# Patient Record
Sex: FEMALE | Race: OTHER | URBAN - METROPOLITAN AREA
[De-identification: names, ages, dates, MRNs, and addresses within clinical notes are randomized per-mention and may not be internally consistent; named-entity substitution may affect disease eponyms.]

---

## 2017-02-07 ENCOUNTER — INPATIENT (INPATIENT)
Facility: HOSPITAL | Age: 47
LOS: 3 days | Discharge: HOME | DRG: 345 | End: 2017-02-11
Attending: SURGERY | Admitting: SURGERY
Payer: COMMERCIAL

## 2017-02-07 VITALS
RESPIRATION RATE: 16 BRPM | DIASTOLIC BLOOD PRESSURE: 72 MMHG | WEIGHT: 147.71 LBS | SYSTOLIC BLOOD PRESSURE: 143 MMHG | HEART RATE: 75 BPM | OXYGEN SATURATION: 100 % | TEMPERATURE: 98 F

## 2017-02-07 DIAGNOSIS — Z98.890 OTHER SPECIFIED POSTPROCEDURAL STATES: Chronic | ICD-10-CM

## 2017-02-07 LAB
ALBUMIN SERPL ELPH-MCNC: 3.3 G/DL — LOW (ref 3.4–5)
ALP SERPL-CCNC: 81 U/L — SIGNIFICANT CHANGE UP (ref 40–120)
ALT FLD-CCNC: 17 U/L — SIGNIFICANT CHANGE UP (ref 12–42)
ANION GAP SERPL CALC-SCNC: 9 MMOL/L — SIGNIFICANT CHANGE UP (ref 9–16)
APPEARANCE UR: CLEAR — SIGNIFICANT CHANGE UP
APTT BLD: 29.3 SEC — SIGNIFICANT CHANGE UP (ref 27.5–37.4)
AST SERPL-CCNC: 12 U/L — LOW (ref 15–37)
BASE EXCESS BLDA CALC-SCNC: -1.5 MMOL/L — SIGNIFICANT CHANGE UP (ref -2–3)
BASOPHILS NFR BLD AUTO: 1.5 % — SIGNIFICANT CHANGE UP (ref 0–2)
BILIRUB SERPL-MCNC: 0.4 MG/DL — SIGNIFICANT CHANGE UP (ref 0.2–1.2)
BILIRUB UR-MCNC: NEGATIVE — SIGNIFICANT CHANGE UP
BLD GP AB SCN SERPL QL: NEGATIVE — SIGNIFICANT CHANGE UP
BUN SERPL-MCNC: 8 MG/DL — SIGNIFICANT CHANGE UP (ref 7–23)
CA-I BLDA-SCNC: 1 MMOL/L — LOW (ref 1.1–1.3)
CALCIUM SERPL-MCNC: 8.1 MG/DL — LOW (ref 8.5–10.5)
CHLORIDE SERPL-SCNC: 108 MMOL/L — SIGNIFICANT CHANGE UP (ref 96–108)
CO2 SERPL-SCNC: 25 MMOL/L — SIGNIFICANT CHANGE UP (ref 22–31)
COHGB MFR BLDA: 0.8 % — SIGNIFICANT CHANGE UP
COLOR SPEC: YELLOW — SIGNIFICANT CHANGE UP
CREAT SERPL-MCNC: 0.42 MG/DL — LOW (ref 0.5–1.3)
DIFF PNL FLD: NEGATIVE — SIGNIFICANT CHANGE UP
EOSINOPHIL NFR BLD AUTO: 0.6 % — SIGNIFICANT CHANGE UP (ref 0–6)
GAS PNL BLDA: SIGNIFICANT CHANGE UP
GLUCOSE SERPL-MCNC: 88 MG/DL — SIGNIFICANT CHANGE UP (ref 70–99)
GLUCOSE UR QL: NEGATIVE — SIGNIFICANT CHANGE UP
HCG UR QL: NEGATIVE — SIGNIFICANT CHANGE UP
HCO3 BLDA-SCNC: 22 MMOL/L — SIGNIFICANT CHANGE UP (ref 21–28)
HCT VFR BLD CALC: 19.2 % — CRITICAL LOW (ref 34.5–45)
HCT VFR BLD CALC: 20.4 % — CRITICAL LOW (ref 34.5–45)
HCT VFR BLD CALC: 28.6 % — LOW (ref 34.5–45)
HGB BLD-MCNC: 5.1 G/DL — CRITICAL LOW (ref 11.5–15.5)
HGB BLD-MCNC: 5.6 G/DL — CRITICAL LOW (ref 11.5–15.5)
HGB BLD-MCNC: 8.8 G/DL — LOW (ref 11.5–15.5)
HGB BLDA-MCNC: 7.2 G/DL — LOW (ref 11.5–15.5)
INR BLD: 1.12 — SIGNIFICANT CHANGE UP (ref 0.88–1.16)
KETONES UR-MCNC: NEGATIVE — SIGNIFICANT CHANGE UP
LACTATE SERPL-SCNC: 1.1 MMOL/L — SIGNIFICANT CHANGE UP (ref 0.5–2)
LEUKOCYTE ESTERASE UR-ACNC: NEGATIVE — SIGNIFICANT CHANGE UP
LIDOCAIN IGE QN: 114 U/L — SIGNIFICANT CHANGE UP (ref 73–393)
LYMPHOCYTES # BLD AUTO: 21.5 % — SIGNIFICANT CHANGE UP (ref 13–44)
MCHC RBC-ENTMCNC: 14.5 PG — LOW (ref 27–34)
MCHC RBC-ENTMCNC: 14.9 PG — LOW (ref 27–34)
MCHC RBC-ENTMCNC: 18.8 PG — LOW (ref 27–34)
MCHC RBC-ENTMCNC: 26.6 G/DL — LOW (ref 32–36)
MCHC RBC-ENTMCNC: 27.5 G/DL — LOW (ref 32–36)
MCHC RBC-ENTMCNC: 30.8 G/DL — LOW (ref 32–36)
MCV RBC AUTO: 54.1 FL — LOW (ref 80–100)
MCV RBC AUTO: 54.7 FL — LOW (ref 80–100)
MCV RBC AUTO: 61.2 FL — LOW (ref 80–100)
MONOCYTES NFR BLD AUTO: 16 % — HIGH (ref 2–14)
NEUTROPHILS NFR BLD AUTO: 60.4 % — SIGNIFICANT CHANGE UP (ref 43–77)
NITRITE UR-MCNC: NEGATIVE — SIGNIFICANT CHANGE UP
O2 CT VFR BLDA CALC: SIGNIFICANT CHANGE UP (ref 15–23)
OXYHGB MFR BLDA: 99 % — SIGNIFICANT CHANGE UP (ref 94–100)
PCO2 BLDA: 30 MMHG — LOW (ref 32–45)
PH BLDA: 7.48 — HIGH (ref 7.35–7.45)
PH UR: 8.5 — HIGH (ref 4–8)
PLATELET # BLD AUTO: 187 K/UL — SIGNIFICANT CHANGE UP (ref 150–400)
PLATELET # BLD AUTO: 223 K/UL — SIGNIFICANT CHANGE UP (ref 150–400)
PLATELET # BLD AUTO: 255 K/UL — SIGNIFICANT CHANGE UP (ref 150–400)
PO2 BLDA: 474 MMHG — HIGH (ref 83–108)
POTASSIUM BLDA-SCNC: 4.2 MMOL/L — SIGNIFICANT CHANGE UP (ref 3.5–4.9)
POTASSIUM SERPL-MCNC: 3.7 MMOL/L — SIGNIFICANT CHANGE UP (ref 3.5–5.3)
POTASSIUM SERPL-SCNC: 3.7 MMOL/L — SIGNIFICANT CHANGE UP (ref 3.5–5.3)
PROT SERPL-MCNC: 6.8 G/DL — SIGNIFICANT CHANGE UP (ref 6.4–8.2)
PROT UR-MCNC: NEGATIVE MG/DL — SIGNIFICANT CHANGE UP
PROTHROM AB SERPL-ACNC: 12.5 SEC — SIGNIFICANT CHANGE UP (ref 10–13.1)
RBC # BLD: 3.51 M/UL — LOW (ref 3.8–5.2)
RBC # BLD: 3.77 M/UL — LOW (ref 3.8–5.2)
RBC # BLD: 4.67 M/UL — SIGNIFICANT CHANGE UP (ref 3.8–5.2)
RBC # FLD: 20.7 % — HIGH (ref 10.3–16.9)
RBC # FLD: 20.7 % — HIGH (ref 10.3–16.9)
RBC # FLD: 28.2 % — HIGH (ref 10.3–16.9)
RH IG SCN BLD-IMP: POSITIVE — SIGNIFICANT CHANGE UP
SAO2 % BLDA: 100 % — SIGNIFICANT CHANGE UP (ref 95–100)
SODIUM BLDA-SCNC: 134 MMOL/L — LOW (ref 138–146)
SODIUM SERPL-SCNC: 142 MMOL/L — SIGNIFICANT CHANGE UP (ref 135–145)
SP GR SPEC: 1.01 — SIGNIFICANT CHANGE UP (ref 1–1.03)
UROBILINOGEN FLD QL: 2 E.U./DL
WBC # BLD: 10.7 K/UL — HIGH (ref 3.8–10.5)
WBC # BLD: 3.4 K/UL — LOW (ref 3.8–10.5)
WBC # BLD: 3.9 K/UL — SIGNIFICANT CHANGE UP (ref 3.8–10.5)
WBC # FLD AUTO: 10.7 K/UL — HIGH (ref 3.8–10.5)
WBC # FLD AUTO: 3.4 K/UL — LOW (ref 3.8–10.5)
WBC # FLD AUTO: 3.9 K/UL — SIGNIFICANT CHANGE UP (ref 3.8–10.5)

## 2017-02-07 PROCEDURE — 74177 CT ABD & PELVIS W/CONTRAST: CPT | Mod: 26

## 2017-02-07 PROCEDURE — 44055 CORRECT MALROTATION OF BOWEL: CPT | Mod: GC

## 2017-02-07 PROCEDURE — 71010: CPT | Mod: 26

## 2017-02-07 PROCEDURE — 99285 EMERGENCY DEPT VISIT HI MDM: CPT | Mod: 25

## 2017-02-07 RX ORDER — HYDROMORPHONE HYDROCHLORIDE 2 MG/ML
1 INJECTION INTRAMUSCULAR; INTRAVENOUS; SUBCUTANEOUS EVERY 4 HOURS
Qty: 0 | Refills: 0 | Status: DISCONTINUED | OUTPATIENT
Start: 2017-02-07 | End: 2017-02-10

## 2017-02-07 RX ORDER — SODIUM CHLORIDE 9 MG/ML
2000 INJECTION INTRAMUSCULAR; INTRAVENOUS; SUBCUTANEOUS ONCE
Qty: 0 | Refills: 0 | Status: COMPLETED | OUTPATIENT
Start: 2017-02-07 | End: 2017-02-07

## 2017-02-07 RX ORDER — SODIUM CHLORIDE 9 MG/ML
1000 INJECTION, SOLUTION INTRAVENOUS
Qty: 0 | Refills: 0 | Status: DISCONTINUED | OUTPATIENT
Start: 2017-02-07 | End: 2017-02-10

## 2017-02-07 RX ORDER — MORPHINE SULFATE 50 MG/1
4 CAPSULE, EXTENDED RELEASE ORAL ONCE
Qty: 0 | Refills: 0 | Status: DISCONTINUED | OUTPATIENT
Start: 2017-02-07 | End: 2017-02-07

## 2017-02-07 RX ORDER — HEPARIN SODIUM 5000 [USP'U]/ML
5000 INJECTION INTRAVENOUS; SUBCUTANEOUS EVERY 8 HOURS
Qty: 0 | Refills: 0 | Status: DISCONTINUED | OUTPATIENT
Start: 2017-02-07 | End: 2017-02-11

## 2017-02-07 RX ORDER — IOHEXOL 300 MG/ML
50 INJECTION, SOLUTION INTRAVENOUS ONCE
Qty: 0 | Refills: 0 | Status: COMPLETED | OUTPATIENT
Start: 2017-02-07 | End: 2017-02-07

## 2017-02-07 RX ORDER — HYDROMORPHONE HYDROCHLORIDE 2 MG/ML
0.5 INJECTION INTRAMUSCULAR; INTRAVENOUS; SUBCUTANEOUS EVERY 4 HOURS
Qty: 0 | Refills: 0 | Status: DISCONTINUED | OUTPATIENT
Start: 2017-02-07 | End: 2017-02-10

## 2017-02-07 RX ORDER — ONDANSETRON 8 MG/1
4 TABLET, FILM COATED ORAL EVERY 6 HOURS
Qty: 0 | Refills: 0 | Status: DISCONTINUED | OUTPATIENT
Start: 2017-02-07 | End: 2017-02-11

## 2017-02-07 RX ADMIN — MORPHINE SULFATE 4 MILLIGRAM(S): 50 CAPSULE, EXTENDED RELEASE ORAL at 11:28

## 2017-02-07 RX ADMIN — MORPHINE SULFATE 4 MILLIGRAM(S): 50 CAPSULE, EXTENDED RELEASE ORAL at 15:50

## 2017-02-07 RX ADMIN — SODIUM CHLORIDE 2000 MILLILITER(S): 9 INJECTION INTRAMUSCULAR; INTRAVENOUS; SUBCUTANEOUS at 11:29

## 2017-02-07 RX ADMIN — MORPHINE SULFATE 4 MILLIGRAM(S): 50 CAPSULE, EXTENDED RELEASE ORAL at 12:14

## 2017-02-07 RX ADMIN — MORPHINE SULFATE 4 MILLIGRAM(S): 50 CAPSULE, EXTENDED RELEASE ORAL at 11:29

## 2017-02-07 RX ADMIN — IOHEXOL 50 MILLILITER(S): 300 INJECTION, SOLUTION INTRAVENOUS at 11:28

## 2017-02-07 NOTE — ED ADULT NURSE REASSESSMENT NOTE - NS ED NURSE REASSESS COMMENT FT1
OR called for report, report given to KEI Evangelista but pt not admitted/signed out. Surgery MD Srivastava in department at this time, spoke with SHANAE Hobbs and accepted pt to surgical service, pt to be transported to OR stat. RN Mecua transporting pt to OR.

## 2017-02-07 NOTE — H&P ADULT. - RS GEN PE MLT RESP DETAILS PC
breath sounds equal/clear to auscultation bilaterally/no rhonchi/no wheezes/no rales/airway patent/respirations non-labored

## 2017-02-07 NOTE — H&P ADULT. - RADIOLOGY RESULTS AND INTERPRETATION
EXAM:  CT ABDOMEN AND PELVIS OC IC    ////      PROCEDURE DATE:  02/07/2017    IMPRESSION:  1.  Swirling of mesenteric vessels. Mild dilatation of the mid small   bowel and normal caliber distal small bowel but containing oral contrast   which may represent a localized small bowel ileus. However, a repeat CT   in 2 to 3 hours may be helpful to exclude partial or early bowel   obstruction given the swirling of the mesenteric vessels.    2.  2.3 x 2.0 cm heterogeneous solid mass in the upper pole of the left   kidney concerning for renal cell carcinoma. Recommend further evaluation   with MRI and renal mass protocol.  3.  Small fat-containing umbilical hernia with minimal fat stranding   within it. Recommend correlation with physical exam to determine if it is   reducible.  4.  Cholelithiasis within a slightly distended gallbladder.  5.  Small pelvic free fluid, more than normally seen physiologically.

## 2017-02-07 NOTE — ED PROVIDER NOTE - GASTROINTESTINAL, MLM
Abdomen with periumbilical tenderness, no rebound.  No RLQ or RUQ tenderness.  Questionable umbilical hernia on initial exam, suspect reduced with palpation.  Rectal nontender, Guaiac negative.

## 2017-02-07 NOTE — H&P ADULT. - HISTORY OF PRESENT ILLNESS
45yo F with PMHx of chronic anemia, morbid obesity s/p RYGB in 2004 and total body lift in 2006 who presents with a 1-day history of acute onset abdominal pain.  Concern for internal hernia. 47yo F with PMHx of chronic anemia, morbid obesity s/p RYGB in 2004 and total body lift in 2006 who presents with a 1-day history of acute onset abdominal pain.  Patient states that it started around 9pm last night, is crampy yet constant, a 10/10 in severity at its worst.  Patient referred to feeling like she was having "menstrual cramps that wouldn't go away" diffusely throughout her abdomen.  Patient denies n/v/f/c.  Last BM was 8 this morning, normal per pt.  Pt last recalls passing flatus yesterday.  Endorses increased belching.  CT scan shows e/o possible internal hernia/hall's hernia and concerning L renal mass.  Clinical exam reveals moderate abdominal discomfort.  Of note, patient's hgb on arrival to the ED is 5.6.  Received 2 units pRBCs in ED. Patient states she has had chronic anemia even before undergoing bypass.  Although, states that she was never worked-up for it.   Patient had bypass in 2004 and was told at that time that she would likely require nutritional support in the future, but patient has never followed up with her surgeon (she thinks it was a Dr. Crawley? at [facility]) or even a PCP.

## 2017-02-07 NOTE — ED ADULT TRIAGE NOTE - ARRIVAL INFO ADDITIONAL COMMENTS
Generalized abdominal pain and lower back pain. No prior tx. Denies any fever, N/V/D, urinary symptoms, blood in urine or stool, syncopal episodes.

## 2017-02-07 NOTE — ED ADULT NURSE REASSESSMENT NOTE - NS ED NURSE REASSESS COMMENT FT1
Per SHANAE Baum to run first pRBC unit over 1 hour and 2nd unit over 2 hours. 1st unit almost complete, second unit to be obtained from blood bank. Pt remains with VSS and no c/o fevers, chills, SOB, back pain. Will continue to monitor.

## 2017-02-07 NOTE — ED ADULT NURSE REASSESSMENT NOTE - NS ED NURSE REASSESS COMMENT FT1
Received pt from KEI Levine transferred to [facility] for closer monitoring and possible pRBC transfusion. Pt endorses 9/10 generalized abdominal pain at this time, SHANAE Baum aware. Pt returned from CT and XR, repeat CBC drawn and sent. Per SHANAE Baum pt pending CT results and repeat CBC results to determine if pt will be transfused. Pt aware with plan of care with family at bedside, tv provided. Will continue to monitor.

## 2017-02-07 NOTE — H&P ADULT. - ASSESSMENT
47yo F with PMHx of chronic anemia, morbid obesity s/p RYGB in 2004 and total body lift in 2006 who presents with a 1-day history of acute onset abdominal pain.  Concern for internal hernia.    - Admit patient to team 2 surgery (Dr. Ocampo)  - Book OR for diagnostic laparoscopy, class II  - PPx: 47yo F with PMHx of chronic anemia, morbid obesity s/p RYGB in 2004 and total body lift in 2006 who presents with a 1-day history of acute onset abdominal pain.  No n/v/f/c, +BM, but CT very concerning for internal hernia and possible renal malignancy.  Exam with tympany LUQ and moderate discomfort on palpation.  Patient severely anemic, received 2 units pRBCs in ED.      - Admit patient to team 2 surgery (Dr. Ocampo)  - Book OR for diagnostic laparoscopy, class II  - NPO, IVF  - No home meds  - Urology consult for possible renal malignancy  - Patient needs to be set up with PCP for issues of anemia/nutritional support  - PPx: SCDs, OOB/ambulate as tolerated

## 2017-02-07 NOTE — ED PROVIDER NOTE - OBJECTIVE STATEMENT
pt with hx of Gastric Bypass in 2004, several cosmetic surgeries, chronic anemia (? type) - presenting with abdominal pain since last night.  The pain began around 10 pm while at rest watching TV.  Pain is located periumbilically, radiates across her back, and is constant.  No nausea or vomiting, no fever, no diarrhea or constipation. Last BM today was "normal".  No melena or BRBPR.  No urinary symptoms.

## 2017-02-07 NOTE — PROGRESS NOTE ADULT - SUBJECTIVE AND OBJECTIVE BOX
POST-OPERATIVE NOTE    Procedure: Diagnostic laparoscopy converted to exploratory laparotomy with reduction of internal hernia and primary closure of mesenteric defect.    Diagnosis/Indication:internal hernia    Surgeon: Dr. Ocampo    S: Pt seen and examined at bedside in the PACU, currently has no complaints.  She denies CP/SOB/N/V and has yet to void.    O:  T(C): 36.4, Max: 36.4 (02-07 @ 22:30)  T(F): 97.5, Max: 97.5 (02-07 @ 22:30)  HR: 78 (71 - 78)  BP: 128/64 (121/59 - 151/52)  RR: 18 (15 - 18)  SpO2: 99% (98% - 100%)  Wt(kg): --                        8.8    10.7  )-----------( 187      ( 07 Feb 2017 22:31 )             28.6     07 Feb 2017 11:32    142    |  108    |  8      ----------------------------<  88     3.7     |  25     |  0.42     Ca    8.1        07 Feb 2017 11:32    TPro  6.8    /  Alb  3.3    /  TBili  0.4    /  DBili  x      /  AST  12     /  ALT  17     /  AlkPhos  81     07 Feb 2017 11:32      Gen: NAD, resting comfortably in bed  C/V: NSR  Pulm: Nonlabored breathing, no respiratory distress, CTAB, IS 1000  Abd: soft, NT/ND, midline incision dressing c/d/i  Extrem: WWP, no calf edema or tenderness, SCDs in place      A/P: 46y Female s/p above procedure with anemia of chronic dz now s/p 3u pRBC transfused with post op hgb at 8.8.  NPO/IVF o/n  Pain/nausea control  SCDs/SQH  TOV  OOBA/IS  AM labs

## 2017-02-07 NOTE — BRIEF OPERATIVE NOTE - OPERATION/FINDINGS
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  Congested small bowel mesentery

## 2017-02-07 NOTE — ED ADULT NURSE NOTE - OBJECTIVE STATEMENT
Patient to ED complaining of generalized abdominal pain x 3 days, denies associative symptoms of nausea/vomiting/diarrhea. Patient reports constant aching pain. Stool guaiac negative. Patient denies SOB, fever, chest pain, heart burn, dizziness. Bowel sounds auscultated in all 4 quadrants, soft abdomen. Mild distention noted.

## 2017-02-07 NOTE — ED PROVIDER NOTE - MEDICAL DECISION MAKING DETAILS
Pt with hx of gastric bypass having severe periumbilical pain.  Severely anemic, confirmed on repeat CBC Hb 5.1, will transfuse.  Surgery consulted and CT results pending.

## 2017-02-07 NOTE — ED PROVIDER NOTE - ATTENDING CONTRIBUTION TO CARE
46 F hx of gastric bypass 13 year ago p/w periumbilical pain since last night, no n/v/d/c.  Pt with  stable VS, ttp periumbilical region, uncomfortable.  ? hernia to umbilicus.  Plan labs, CT, pain control and reassess.

## 2017-02-08 LAB
ANION GAP SERPL CALC-SCNC: 8 MMOL/L — LOW (ref 9–16)
BUN SERPL-MCNC: 6 MG/DL — LOW (ref 7–23)
CALCIUM SERPL-MCNC: 7.9 MG/DL — LOW (ref 8.5–10.5)
CHLORIDE SERPL-SCNC: 107 MMOL/L — SIGNIFICANT CHANGE UP (ref 96–108)
CO2 SERPL-SCNC: 26 MMOL/L — SIGNIFICANT CHANGE UP (ref 22–31)
CREAT SERPL-MCNC: 0.47 MG/DL — LOW (ref 0.5–1.3)
GLUCOSE SERPL-MCNC: 136 MG/DL — HIGH (ref 70–99)
HCT VFR BLD CALC: 28.4 % — LOW (ref 34.5–45)
HGB BLD-MCNC: 8.7 G/DL — LOW (ref 11.5–15.5)
MAGNESIUM SERPL-MCNC: 1.7 MG/DL — SIGNIFICANT CHANGE UP (ref 1.6–2.4)
MCHC RBC-ENTMCNC: 18.9 PG — LOW (ref 27–34)
MCHC RBC-ENTMCNC: 30.6 G/DL — LOW (ref 32–36)
MCV RBC AUTO: 61.6 FL — LOW (ref 80–100)
METHGB MFR BLDA: 0 % — SIGNIFICANT CHANGE UP
PHOSPHATE SERPL-MCNC: 4.1 MG/DL — SIGNIFICANT CHANGE UP (ref 2.5–4.5)
PLATELET # BLD AUTO: 152 K/UL — SIGNIFICANT CHANGE UP (ref 150–400)
POTASSIUM SERPL-MCNC: 3.8 MMOL/L — SIGNIFICANT CHANGE UP (ref 3.5–5.3)
POTASSIUM SERPL-SCNC: 3.8 MMOL/L — SIGNIFICANT CHANGE UP (ref 3.5–5.3)
RBC # BLD: 4.61 M/UL — SIGNIFICANT CHANGE UP (ref 3.8–5.2)
RBC # FLD: 27.5 % — HIGH (ref 10.3–16.9)
SODIUM SERPL-SCNC: 141 MMOL/L — SIGNIFICANT CHANGE UP (ref 135–145)
WBC # BLD: 11.9 K/UL — HIGH (ref 3.8–10.5)
WBC # FLD AUTO: 11.9 K/UL — HIGH (ref 3.8–10.5)

## 2017-02-08 PROCEDURE — 99223 1ST HOSP IP/OBS HIGH 75: CPT

## 2017-02-08 PROCEDURE — 74183 MRI ABD W/O CNTR FLWD CNTR: CPT | Mod: 26

## 2017-02-08 RX ORDER — PREGABALIN 225 MG/1
1000 CAPSULE ORAL ONCE
Qty: 0 | Refills: 0 | Status: COMPLETED | OUTPATIENT
Start: 2017-02-08 | End: 2017-02-08

## 2017-02-08 RX ORDER — FOLIC ACID/VIT B COMPLEX AND C 400 MCG
10000 TABLET ORAL ONCE
Qty: 0 | Refills: 0 | Status: COMPLETED | OUTPATIENT
Start: 2017-02-08 | End: 2017-02-08

## 2017-02-08 RX ORDER — MAGNESIUM SULFATE 500 MG/ML
2 VIAL (ML) INJECTION ONCE
Qty: 0 | Refills: 0 | Status: COMPLETED | OUTPATIENT
Start: 2017-02-08 | End: 2017-02-08

## 2017-02-08 RX ORDER — ERGOCALCIFEROL 1.25 MG/1
50000 CAPSULE ORAL
Qty: 0 | Refills: 0 | Status: DISCONTINUED | OUTPATIENT
Start: 2017-02-08 | End: 2017-02-11

## 2017-02-08 RX ORDER — SODIUM FERRIC GLUCONAT/SUCROSE 62.5MG/5ML
25 AMPUL (ML) INTRAVENOUS ONCE
Qty: 0 | Refills: 0 | Status: COMPLETED | OUTPATIENT
Start: 2017-02-08 | End: 2017-02-08

## 2017-02-08 RX ORDER — SODIUM FERRIC GLUCONAT/SUCROSE 62.5MG/5ML
100 AMPUL (ML) INTRAVENOUS ONCE
Qty: 0 | Refills: 0 | Status: COMPLETED | OUTPATIENT
Start: 2017-02-08 | End: 2017-02-08

## 2017-02-08 RX ADMIN — Medication 10000 UNIT(S): at 15:36

## 2017-02-08 RX ADMIN — HYDROMORPHONE HYDROCHLORIDE 1 MILLIGRAM(S): 2 INJECTION INTRAMUSCULAR; INTRAVENOUS; SUBCUTANEOUS at 01:30

## 2017-02-08 RX ADMIN — PREGABALIN 1000 MICROGRAM(S): 225 CAPSULE ORAL at 15:37

## 2017-02-08 RX ADMIN — SODIUM CHLORIDE 125 MILLILITER(S): 9 INJECTION, SOLUTION INTRAVENOUS at 09:35

## 2017-02-08 RX ADMIN — HYDROMORPHONE HYDROCHLORIDE 1 MILLIGRAM(S): 2 INJECTION INTRAMUSCULAR; INTRAVENOUS; SUBCUTANEOUS at 11:55

## 2017-02-08 RX ADMIN — HYDROMORPHONE HYDROCHLORIDE 1 MILLIGRAM(S): 2 INJECTION INTRAMUSCULAR; INTRAVENOUS; SUBCUTANEOUS at 06:19

## 2017-02-08 RX ADMIN — ERGOCALCIFEROL 50000 UNIT(S): 1.25 CAPSULE ORAL at 15:39

## 2017-02-08 RX ADMIN — Medication 108 MILLIGRAM(S): at 19:25

## 2017-02-08 RX ADMIN — HYDROMORPHONE HYDROCHLORIDE 1 MILLIGRAM(S): 2 INJECTION INTRAMUSCULAR; INTRAVENOUS; SUBCUTANEOUS at 17:51

## 2017-02-08 RX ADMIN — HYDROMORPHONE HYDROCHLORIDE 1 MILLIGRAM(S): 2 INJECTION INTRAMUSCULAR; INTRAVENOUS; SUBCUTANEOUS at 18:15

## 2017-02-08 RX ADMIN — HYDROMORPHONE HYDROCHLORIDE 1 MILLIGRAM(S): 2 INJECTION INTRAMUSCULAR; INTRAVENOUS; SUBCUTANEOUS at 06:15

## 2017-02-08 RX ADMIN — Medication 50 GRAM(S): at 09:38

## 2017-02-08 RX ADMIN — HEPARIN SODIUM 5000 UNIT(S): 5000 INJECTION INTRAVENOUS; SUBCUTANEOUS at 05:41

## 2017-02-08 RX ADMIN — Medication 204 MILLIGRAM(S): at 15:37

## 2017-02-08 RX ADMIN — HYDROMORPHONE HYDROCHLORIDE 1 MILLIGRAM(S): 2 INJECTION INTRAMUSCULAR; INTRAVENOUS; SUBCUTANEOUS at 11:09

## 2017-02-08 RX ADMIN — HEPARIN SODIUM 5000 UNIT(S): 5000 INJECTION INTRAVENOUS; SUBCUTANEOUS at 21:32

## 2017-02-08 RX ADMIN — HYDROMORPHONE HYDROCHLORIDE 1 MILLIGRAM(S): 2 INJECTION INTRAMUSCULAR; INTRAVENOUS; SUBCUTANEOUS at 01:36

## 2017-02-08 RX ADMIN — HEPARIN SODIUM 5000 UNIT(S): 5000 INJECTION INTRAVENOUS; SUBCUTANEOUS at 14:33

## 2017-02-08 NOTE — CONSULT NOTE ADULT - SUBJECTIVE AND OBJECTIVE BOX
HPI:  46F hx chronic anemia, morbid obesity s/p RYGB in 2004 and total body lift in 2006 who presents with a 1-day history of acute onset abdominal pain.  Patient found to have internal hiatal hernia s/p hernia repair 2/8. Called to see patient for incidental finding of 2.3cm upper pole left kidney mass. Pt denies history hematuria, dysuria, incontinence, retention or other LUTS.  No hx kidney stones or UTIs. Denies history of smoking. No flank pain.       Vital Signs Last 24 Hrs  T(C): 37.1, Max: 37.1 (02-08 @ 06:19)  T(F): 98.7, Max: 98.8 (02-08 @ 06:19)  HR: 78 (67 - 91)  BP: 122/67 (106/61 - 151/52)  BP(mean): --  RR: 16 (15 - 18)  SpO2: 96% (95% - 100%)  I&O's Summary  I & Os for 24h ending 08 Feb 2017 07:00  =============================================  IN: 0 ml / OUT: 800 ml / NET: -800 ml    I & Os for current day (as of 08 Feb 2017 12:21)  =============================================  IN: 250 ml / OUT: 450 ml / NET: -200 ml      PE:  Gen: NAD  Abd: slightly distended, calvin TTP postop, prior incision well healed  : voiding clear     LABS:                        8.7    11.9  )-----------( 152      ( 08 Feb 2017 06:08 )             28.4     08 Feb 2017 06:08    141    |  107    |  6      ----------------------------<  136    3.8     |  26     |  0.47     Ca    7.9        08 Feb 2017 06:08  Phos  4.1       08 Feb 2017 06:08  Mg     1.7       08 Feb 2017 06:08    TPro  6.8    /  Alb  3.3    /  TBili  0.4    /  DBili  x      /  AST  12     /  ALT  17     /  AlkPhos  81     07 Feb 2017 11:32    PT/INR - ( 07 Feb 2017 12:15 )   PT: 12.5 sec;   INR: 1.12          PTT - ( 07 Feb 2017 12:15 )  PTT:29.3 sec  Cultures      A/P: 46F postop hiatal hernia repair with incidental 2 x2.3cm upper pole mass in L kidney  1- Will discuss need for MRI with Dr Caban  2- Fu with Dr Caban as outpatient for serial imaging HPI:  46F hx chronic anemia, morbid obesity s/p RYGB in 2004 and total body lift in 2006 who presents with a 1-day history of acute onset abdominal pain.  Patient found to have internal hiatal hernia s/p hernia repair 2/8. Called to see patient for incidental finding of 2.3cm upper pole left kidney mass. Pt denies history hematuria, dysuria, incontinence, retention or other LUTS.  No hx kidney stones or UTIs. Denies history of smoking. No flank pain.       Vital Signs Last 24 Hrs  T(C): 37.1, Max: 37.1 (02-08 @ 06:19)  T(F): 98.7, Max: 98.8 (02-08 @ 06:19)  HR: 78 (67 - 91)  BP: 122/67 (106/61 - 151/52)  BP(mean): --  RR: 16 (15 - 18)  SpO2: 96% (95% - 100%)  I&O's Summary  I & Os for 24h ending 08 Feb 2017 07:00  =============================================  IN: 0 ml / OUT: 800 ml / NET: -800 ml    I & Os for current day (as of 08 Feb 2017 12:21)  =============================================  IN: 250 ml / OUT: 450 ml / NET: -200 ml      PE:  Gen: NAD  Abd: slightly distended, calvin TTP postop, prior incision well healed  : voiding clear     LABS:                        8.7    11.9  )-----------( 152      ( 08 Feb 2017 06:08 )             28.4     08 Feb 2017 06:08    141    |  107    |  6      ----------------------------<  136    3.8     |  26     |  0.47     Ca    7.9        08 Feb 2017 06:08  Phos  4.1       08 Feb 2017 06:08  Mg     1.7       08 Feb 2017 06:08    TPro  6.8    /  Alb  3.3    /  TBili  0.4    /  DBili  x      /  AST  12     /  ALT  17     /  AlkPhos  81     07 Feb 2017 11:32    PT/INR - ( 07 Feb 2017 12:15 )   PT: 12.5 sec;   INR: 1.12          PTT - ( 07 Feb 2017 12:15 )  PTT:29.3 sec  Cultures      A/P: 46F postop hiatal hernia repair with incidental 2 x2.3cm upper pole mass in L kidney  1- MRI renal mass protocol  2- Fu with Dr Caban as outpatient for serial imaging

## 2017-02-08 NOTE — PROGRESS NOTE ADULT - SUBJECTIVE AND OBJECTIVE BOX
O/N: OR for open reyes's hernia repair, 1 additional pRBC unit given in OR, POC WNL, IS 1000,  passed TOV.  2/7: OR, hgb 5.6 on arrival, received 2 units pRBCs in ED    STATUS POST: diagnostic lap converted to ex lap with reduction of reyes's hernia and primary repair of mesenteric defect. NO bowel resected.    POST OP DAY #: 1 O/N: OR for open reyes's hernia repair, 1 additional pRBC unit given in OR, POC WNL, IS 1000,  passed TOV.  : OR, hgb 5.6 on arrival, received 2 units pRBCs in ED    STATUS POST: diagnostic lap converted to ex lap with reduction of reyes's hernia and primary repair of mesenteric defect. NO bowel resected.    POST OP DAY #: 1        SUBJECTIVE: Patient complains of incisional pain  Flatus: [] YES [X] NO             Bowel Movement: [ ] YES [X ] NO  Pain (0-10):            Pain Control Adequate: [X ] YES [ ] NO  Nausea: [ ] YES [X ] NO            Vomiting: [ ] YES [X ] NO  Diarrhea: [ ] YES [X ] NO         Constipation: [ ] YES [X ] NO     Chest Pain: [ ] YES [ X] NO    SOB:  [ ] YES [X ] NO    heparin  Injectable 5000Unit(s) SubCutaneous every 8 hours      Vital Signs Last 24 Hrs  T(C): 37.1, Max: 37.1 ( @ 06:19)  T(F): 98.7, Max: 98.8 ( @ 06:19)  HR: 84 (64 - 91)  BP: 106/61 (106/61 - 151/52)  BP(mean): --  RR: 16 (15 - 18)  SpO2: 95% (95% - 100%)  I&O's Detail    I & Os for current day (as of 2017 09:49)  =============================================  IN:    Total IN: 0 ml  ---------------------------------------------  OUT:    Voided: 800 ml    Total OUT: 800 ml  ---------------------------------------------  Total NET: -800 ml      General: NAD, resting comfortably in bed  C/V: NSR  Pulm: Nonlabored breathing, no respiratory distress  Abd: soft, non distended, TTP around incision site ,incisional clean dry and intact  Extrem: WWP, no edema, SCDs in place        LABS:                        8.7    11.9  )-----------( 152      ( 2017 06:08 )             28.4     2017 06:08    141    |  107    |  6      ----------------------------<  136    3.8     |  26     |  0.47     Ca    7.9        2017 06:08  Phos  4.1       2017 06:08  Mg     1.7       2017 06:08    TPro  6.8    /  Alb  3.3    /  TBili  0.4    /  DBili  x      /  AST  12     /  ALT  17     /  AlkPhos  81     2017 11:32    PT/INR - ( 2017 12:15 )   PT: 12.5 sec;   INR: 1.12          PTT - ( 2017 12:15 )  PTT:29.3 sec  Urinalysis Basic - ( 2017 11:32 )    Color: Yellow / Appearance: Clear / S.015 / pH: x  Gluc: x / Ketone: NEGATIVE  / Bili: NEGATIVE / Urobili: 2.0 E.U./dL   Blood: x / Protein: NEGATIVE mg/dL / Nitrite: NEGATIVE   Leuk Esterase: NEGATIVE / RBC: x / WBC x   Sq Epi: x / Non Sq Epi: x / Bacteria: x        RADIOLOGY & ADDITIONAL STUDIES:

## 2017-02-08 NOTE — CONSULT NOTE ADULT - ATTENDING COMMENTS
Patient seen and examined. Reviewed CT findings with patient and spouse. Recommend MRI renal mass protocol for better imaging. No acute intervention at this time. Can f/u as outpatient for further counseling and management.

## 2017-02-08 NOTE — PROGRESS NOTE ADULT - SUBJECTIVE AND OBJECTIVE BOX
s/p ex lap for midgut volvulus   patient is fortunate that all bowel was viable  has profound anemia which is reflective of poor use of supplements following rygb which I discussed  while in hospital would give banana bag  b12 fe and make sure started on multivitamins  importance of compliance and follow up explained in great detail  patient had original open rygb at [facility] in 2004

## 2017-02-09 LAB
24R-OH-CALCIDIOL SERPL-MCNC: 12.1 NG/ML — LOW (ref 30–100)
ANION GAP SERPL CALC-SCNC: 7 MMOL/L — LOW (ref 9–16)
BUN SERPL-MCNC: 8 MG/DL — SIGNIFICANT CHANGE UP (ref 7–23)
CALCIUM SERPL-MCNC: 8.1 MG/DL — LOW (ref 8.5–10.5)
CALCIUM SERPL-MCNC: 8.5 MG/DL — SIGNIFICANT CHANGE UP (ref 8.4–10.5)
CHLORIDE SERPL-SCNC: 107 MMOL/L — SIGNIFICANT CHANGE UP (ref 96–108)
CO2 SERPL-SCNC: 27 MMOL/L — SIGNIFICANT CHANGE UP (ref 22–31)
CREAT SERPL-MCNC: 0.52 MG/DL — SIGNIFICANT CHANGE UP (ref 0.5–1.3)
GLUCOSE SERPL-MCNC: 73 MG/DL — SIGNIFICANT CHANGE UP (ref 70–99)
HCT VFR BLD CALC: 25.5 % — LOW (ref 34.5–45)
HGB BLD-MCNC: 7.4 G/DL — LOW (ref 11.5–15.5)
MAGNESIUM SERPL-MCNC: 2.2 MG/DL — SIGNIFICANT CHANGE UP (ref 1.6–2.4)
MCHC RBC-ENTMCNC: 18.3 PG — LOW (ref 27–34)
MCHC RBC-ENTMCNC: 29 G/DL — LOW (ref 32–36)
MCV RBC AUTO: 63 FL — LOW (ref 80–100)
PHOSPHATE SERPL-MCNC: 3.2 MG/DL — SIGNIFICANT CHANGE UP (ref 2.5–4.5)
PLATELET # BLD AUTO: 129 K/UL — LOW (ref 150–400)
POTASSIUM SERPL-MCNC: 3.5 MMOL/L — SIGNIFICANT CHANGE UP (ref 3.5–5.3)
POTASSIUM SERPL-SCNC: 3.5 MMOL/L — SIGNIFICANT CHANGE UP (ref 3.5–5.3)
PTH-INTACT FLD-MCNC: 46 PG/ML — SIGNIFICANT CHANGE UP (ref 15–65)
RBC # BLD: 4.05 M/UL — SIGNIFICANT CHANGE UP (ref 3.8–5.2)
RBC # FLD: 28.4 % — HIGH (ref 10.3–16.9)
SODIUM SERPL-SCNC: 141 MMOL/L — SIGNIFICANT CHANGE UP (ref 135–145)
VIT D25+D1,25 OH+D1,25 PNL SERPL-MCNC: 61.9 PG/ML — SIGNIFICANT CHANGE UP (ref 19.9–79.3)
WBC # BLD: 7.2 K/UL — SIGNIFICANT CHANGE UP (ref 3.8–10.5)
WBC # FLD AUTO: 7.2 K/UL — SIGNIFICANT CHANGE UP (ref 3.8–10.5)

## 2017-02-09 RX ORDER — POTASSIUM CHLORIDE 20 MEQ
10 PACKET (EA) ORAL
Qty: 0 | Refills: 0 | Status: COMPLETED | OUTPATIENT
Start: 2017-02-09 | End: 2017-02-09

## 2017-02-09 RX ADMIN — HYDROMORPHONE HYDROCHLORIDE 1 MILLIGRAM(S): 2 INJECTION INTRAMUSCULAR; INTRAVENOUS; SUBCUTANEOUS at 15:04

## 2017-02-09 RX ADMIN — Medication 100 MILLIEQUIVALENT(S): at 10:37

## 2017-02-09 RX ADMIN — HYDROMORPHONE HYDROCHLORIDE 1 MILLIGRAM(S): 2 INJECTION INTRAMUSCULAR; INTRAVENOUS; SUBCUTANEOUS at 05:45

## 2017-02-09 RX ADMIN — HYDROMORPHONE HYDROCHLORIDE 1 MILLIGRAM(S): 2 INJECTION INTRAMUSCULAR; INTRAVENOUS; SUBCUTANEOUS at 21:04

## 2017-02-09 RX ADMIN — HYDROMORPHONE HYDROCHLORIDE 1 MILLIGRAM(S): 2 INJECTION INTRAMUSCULAR; INTRAVENOUS; SUBCUTANEOUS at 21:31

## 2017-02-09 RX ADMIN — HYDROMORPHONE HYDROCHLORIDE 1 MILLIGRAM(S): 2 INJECTION INTRAMUSCULAR; INTRAVENOUS; SUBCUTANEOUS at 09:50

## 2017-02-09 RX ADMIN — HYDROMORPHONE HYDROCHLORIDE 1 MILLIGRAM(S): 2 INJECTION INTRAMUSCULAR; INTRAVENOUS; SUBCUTANEOUS at 15:30

## 2017-02-09 RX ADMIN — HYDROMORPHONE HYDROCHLORIDE 1 MILLIGRAM(S): 2 INJECTION INTRAMUSCULAR; INTRAVENOUS; SUBCUTANEOUS at 09:26

## 2017-02-09 RX ADMIN — HEPARIN SODIUM 5000 UNIT(S): 5000 INJECTION INTRAVENOUS; SUBCUTANEOUS at 13:56

## 2017-02-09 RX ADMIN — Medication 100 MILLIEQUIVALENT(S): at 09:16

## 2017-02-09 RX ADMIN — HEPARIN SODIUM 5000 UNIT(S): 5000 INJECTION INTRAVENOUS; SUBCUTANEOUS at 05:14

## 2017-02-09 RX ADMIN — HEPARIN SODIUM 5000 UNIT(S): 5000 INJECTION INTRAVENOUS; SUBCUTANEOUS at 21:04

## 2017-02-09 RX ADMIN — HYDROMORPHONE HYDROCHLORIDE 1 MILLIGRAM(S): 2 INJECTION INTRAMUSCULAR; INTRAVENOUS; SUBCUTANEOUS at 00:55

## 2017-02-09 RX ADMIN — HYDROMORPHONE HYDROCHLORIDE 1 MILLIGRAM(S): 2 INJECTION INTRAMUSCULAR; INTRAVENOUS; SUBCUTANEOUS at 05:10

## 2017-02-09 RX ADMIN — SODIUM CHLORIDE 125 MILLILITER(S): 9 INJECTION, SOLUTION INTRAVENOUS at 13:56

## 2017-02-09 RX ADMIN — HYDROMORPHONE HYDROCHLORIDE 1 MILLIGRAM(S): 2 INJECTION INTRAMUSCULAR; INTRAVENOUS; SUBCUTANEOUS at 00:23

## 2017-02-09 NOTE — DIETITIAN INITIAL EVALUATION ADULT. - OTHER INFO
Pt s/p reduction of hernia. Pt tolerating Clear Liquid Diet. Denies any n/v, +flatus, -BM. Pt reports some chewing difficulty 2/2 dentures. Can tolerate soft food.  Pt with hx of RYGB in 2004. Still complies with diet (smaller portions, avoids concentrated sweets). Yet, does not take vitamin supplements. Encouraged intake of vitamins. Wt stable. Pain controlled.

## 2017-02-09 NOTE — PROGRESS NOTE ADULT - SUBJECTIVE AND OBJECTIVE BOX
O/N: MARICRUZ  2/8: patient given Iron, vitamin B12 , A, D  and zinc per Dr. Ocampo O/N: MARICRUZ  : patient given Iron, vitamin B12 , A, D  and zinc per Dr. Ocampo      SUBJECTIVE: Pt seen and examined at bedside. No overnight events.  Pain controlled. No f/c/n/v. + flatus    Vital Signs Last 24 Hrs  T(C): 36.9, Max: 37.2 (- @ 13:35)  T(F): 98.4, Max: 99 (-08 @ 13:35)  HR: 63 (63 - 84)  BP: 116/71 (105/69 - 122/72)  BP(mean): --  RR: 18 (16 - 18)  SpO2: 99% (95% - 100%)    PHYSICAL EXAM    Gen: NAD  Abd: Soft, NT/ND, midline island dressing CDI, port sites CDI    I&O's Detail    I & Os for current day (as of 2017 07:38)  =============================================  IN:    multivitamin/thiamine/folic acid in sodium chloride 0.9%: 250 ml    Total IN: 250 ml  ---------------------------------------------  OUT:    Voided: 450 ml    Total OUT: 450 ml  ---------------------------------------------  Total NET: -200 ml      LABS:                        8.7    11.9  )-----------( 152      ( 2017 06:08 )             28.4     2017 06:44    141    |  107    |  8      ----------------------------<  73     3.5     |  27     |  0.52     Ca    8.1        2017 06:44  Phos  3.2       2017 06:44  Mg     2.2       2017 06:44    TPro  6.8    /  Alb  3.3    /  TBili  0.4    /  DBili  x      /  AST  12     /  ALT  17     /  AlkPhos  81     2017 11:32    PT/INR - ( 2017 12:15 )   PT: 12.5 sec;   INR: 1.12          PTT - ( 2017 12:15 )  PTT:29.3 sec  Urinalysis Basic - ( 2017 11:32 )    Color: Yellow / Appearance: Clear / S.015 / pH: x  Gluc: x / Ketone: NEGATIVE  / Bili: NEGATIVE / Urobili: 2.0 E.U./dL   Blood: x / Protein: NEGATIVE mg/dL / Nitrite: NEGATIVE   Leuk Esterase: NEGATIVE / RBC: x / WBC x   Sq Epi: x / Non Sq Epi: x / Bacteria: x        MEDICATIONS  (STANDING):  heparin  Injectable 5000Unit(s) SubCutaneous every 8 hours  multivitamin/thiamine/folic acid in sodium chloride 0.9% 1000milliLiter(s) IV Continuous <Continuous>  ergocalciferol 41085Uovj(s) Oral every week  potassium chloride  10 mEq/100 mL IVPB 10milliEquivalent(s) IV Intermittent every 1 hour    MEDICATIONS  (PRN):  HYDROmorphone  Injectable 0.5milliGRAM(s) IV Push every 4 hours PRN Moderate Pain  HYDROmorphone  Injectable 1milliGRAM(s) IV Push every 4 hours PRN Severe Pain  ondansetron Injectable 4milliGRAM(s) IV Push every 6 hours PRN Nausea      RADIOLOGY & ADDITIONAL STUDIES:    ASSESSMENT AND PLAN

## 2017-02-09 NOTE — DIETITIAN INITIAL EVALUATION ADULT. - ENERGY NEEDS
Height: 61" (as per pt) Weight: 147lbs, 105IBW lbs+/-10%, %%, BMI - 27  IBW used to calculate energy needs due to pt's current body weight exceeding 120% of IBW   Nutrient needs based on [facility] standards of care for maintenance in adults.

## 2017-02-09 NOTE — PROGRESS NOTE ADULT - SUBJECTIVE AND OBJECTIVE BOX
45 yo female pmh gastric bypass, severe chronic anemia s/p repair of internal hernia no complaints today.  Pt states abdominal discomfort significantly improved.  She is on clear liquids.  Denies fever, chills, n/v, chest pain, sob, leg pain, lightheadedness, dizziness.                       7.4    7.2   )-----------( 129      ( 09 Feb 2017 06:48 )             25.5     09 Feb 2017 06:44    141    |  107    |  8      ----------------------------<  73     3.5     |  27     |  0.52     Ca    8.1        09 Feb 2017 06:44  Phos  3.2       09 Feb 2017 06:44  Mg     2.2       09 Feb 2017 06:44    TPro  6.8    /  Alb  3.3    /  TBili  0.4    /  DBili  x      /  AST  12     /  ALT  17     /  AlkPhos  81     07 Feb 2017 11:32    T(C): 37.3, Max: 37.3 (02-09 @ 09:30)  HR: 88 (63 - 88)  BP: 151/83 (105/69 - 151/83)  RR: 17 (16 - 18)  SpO2: 96% (96% - 100%)  Wt(kg): --    gen: A&0x3, NAD  Heart: s1,s2 RRR,   Lung: CTA B/L  Abd: n/d, n/t, soft, incisions c/d/i  LE: no edema, b/l LE, no jaiden b/l LE  skin: MMM    45 yo female s/p repair of internal hernia, vss, oob, tolerating po, improved h&h    Plan:  incentive spirometer encouraged  bcld  oob, scds,   ppi  nausea control prn  pain control prn

## 2017-02-10 LAB
ANION GAP SERPL CALC-SCNC: 7 MMOL/L — LOW (ref 9–16)
BUN SERPL-MCNC: 5 MG/DL — LOW (ref 7–23)
CALCIUM SERPL-MCNC: 8.3 MG/DL — LOW (ref 8.5–10.5)
CHLORIDE SERPL-SCNC: 106 MMOL/L — SIGNIFICANT CHANGE UP (ref 96–108)
CO2 SERPL-SCNC: 29 MMOL/L — SIGNIFICANT CHANGE UP (ref 22–31)
CREAT SERPL-MCNC: 0.49 MG/DL — LOW (ref 0.5–1.3)
GLUCOSE SERPL-MCNC: 68 MG/DL — LOW (ref 70–99)
HCT VFR BLD CALC: 26.5 % — LOW (ref 34.5–45)
HGB BLD-MCNC: 7.6 G/DL — LOW (ref 11.5–15.5)
MAGNESIUM SERPL-MCNC: 1.8 MG/DL — SIGNIFICANT CHANGE UP (ref 1.6–2.4)
MCHC RBC-ENTMCNC: 18.2 PG — LOW (ref 27–34)
MCHC RBC-ENTMCNC: 28.7 G/DL — LOW (ref 32–36)
MCV RBC AUTO: 63.4 FL — LOW (ref 80–100)
PHOSPHATE SERPL-MCNC: 4.2 MG/DL — SIGNIFICANT CHANGE UP (ref 2.5–4.5)
PLATELET # BLD AUTO: 123 K/UL — LOW (ref 150–400)
POTASSIUM SERPL-MCNC: 3.4 MMOL/L — LOW (ref 3.5–5.3)
POTASSIUM SERPL-SCNC: 3.4 MMOL/L — LOW (ref 3.5–5.3)
RBC # BLD: 4.18 M/UL — SIGNIFICANT CHANGE UP (ref 3.8–5.2)
RBC # FLD: 29.1 % — HIGH (ref 10.3–16.9)
SODIUM SERPL-SCNC: 142 MMOL/L — SIGNIFICANT CHANGE UP (ref 135–145)
WBC # BLD: 4.9 K/UL — SIGNIFICANT CHANGE UP (ref 3.8–10.5)
WBC # FLD AUTO: 4.9 K/UL — SIGNIFICANT CHANGE UP (ref 3.8–10.5)

## 2017-02-10 RX ORDER — POTASSIUM CHLORIDE 20 MEQ
40 PACKET (EA) ORAL ONCE
Qty: 0 | Refills: 0 | Status: COMPLETED | OUTPATIENT
Start: 2017-02-10 | End: 2017-02-10

## 2017-02-10 RX ORDER — POTASSIUM CHLORIDE 20 MEQ
10 PACKET (EA) ORAL
Qty: 0 | Refills: 0 | Status: COMPLETED | OUTPATIENT
Start: 2017-02-10 | End: 2017-02-10

## 2017-02-10 RX ORDER — SENNA PLUS 8.6 MG/1
2 TABLET ORAL AT BEDTIME
Qty: 0 | Refills: 0 | Status: DISCONTINUED | OUTPATIENT
Start: 2017-02-10 | End: 2017-02-11

## 2017-02-10 RX ORDER — DOCUSATE SODIUM 100 MG
100 CAPSULE ORAL DAILY
Qty: 0 | Refills: 0 | Status: DISCONTINUED | OUTPATIENT
Start: 2017-02-10 | End: 2017-02-11

## 2017-02-10 RX ADMIN — Medication 40 MILLIEQUIVALENT(S): at 09:34

## 2017-02-10 RX ADMIN — Medication 100 MILLIEQUIVALENT(S): at 13:12

## 2017-02-10 RX ADMIN — HEPARIN SODIUM 5000 UNIT(S): 5000 INJECTION INTRAVENOUS; SUBCUTANEOUS at 21:12

## 2017-02-10 RX ADMIN — HYDROMORPHONE HYDROCHLORIDE 1 MILLIGRAM(S): 2 INJECTION INTRAMUSCULAR; INTRAVENOUS; SUBCUTANEOUS at 05:46

## 2017-02-10 RX ADMIN — HEPARIN SODIUM 5000 UNIT(S): 5000 INJECTION INTRAVENOUS; SUBCUTANEOUS at 05:08

## 2017-02-10 RX ADMIN — Medication 100 MILLIEQUIVALENT(S): at 09:34

## 2017-02-10 RX ADMIN — SENNA PLUS 2 TABLET(S): 8.6 TABLET ORAL at 21:12

## 2017-02-10 RX ADMIN — HEPARIN SODIUM 5000 UNIT(S): 5000 INJECTION INTRAVENOUS; SUBCUTANEOUS at 13:13

## 2017-02-10 RX ADMIN — Medication 100 MILLIEQUIVALENT(S): at 11:49

## 2017-02-10 RX ADMIN — HYDROMORPHONE HYDROCHLORIDE 1 MILLIGRAM(S): 2 INJECTION INTRAMUSCULAR; INTRAVENOUS; SUBCUTANEOUS at 01:46

## 2017-02-10 RX ADMIN — HYDROMORPHONE HYDROCHLORIDE 1 MILLIGRAM(S): 2 INJECTION INTRAMUSCULAR; INTRAVENOUS; SUBCUTANEOUS at 01:23

## 2017-02-10 RX ADMIN — Medication 100 MILLIGRAM(S): at 18:33

## 2017-02-10 RX ADMIN — HYDROMORPHONE HYDROCHLORIDE 1 MILLIGRAM(S): 2 INJECTION INTRAMUSCULAR; INTRAVENOUS; SUBCUTANEOUS at 05:25

## 2017-02-10 NOTE — PROGRESS NOTE ADULT - SUBJECTIVE AND OBJECTIVE BOX
Patient seen at bedside to review MRI findings. MRI shows approximate 2.5cm left upper pole renal mass suspicious for malignancy. I reviewed with her these findings and differential diagnosis including 20-30% rate of benign tumor. At this size, metastatic potential is extremely low (<1%) and would recommend close observation until she recovers from recent surgery. Can followup as outpatient for further consultation and management.

## 2017-02-10 NOTE — PROGRESS NOTE ADULT - ASSESSMENT
47 yo F s/p above procedure  NPO/IVF  Pain/nausea control  DVT ppx  AM labs  IS  OOBA 45 yo F s/p above procedure  CLD/IVF  Pain/nausea control  DVT ppx  AM labs  IS  OOBA

## 2017-02-10 NOTE — PROGRESS NOTE ADULT - SUBJECTIVE AND OBJECTIVE BOX
O/N; MARICRUZ  2/9: +flatus started on CLD tolerating MRI:  2.4 x 2.1 x 2.5 cm left renal mass consistent with malignancy, probably clear cell carcinoma. O/N; MARICRUZ  2/9: +flatus started on CLD tolerating MRI:  2.4 x 2.1 x 2.5 cm left renal mass consistent with malignancy, probably clear cell carcinoma.    SUBJECTIVE: Pt seen and examined at bedside. No overnight events.  Pain controlled. No f/c/n/v. passing flatus    Vital Signs Last 24 Hrs  T(C): 36.6, Max: 37.4 (02-09 @ 20:23)  T(F): 97.9, Max: 99.3 (02-09 @ 20:23)  HR: 71 (71 - 88)  BP: 111/68 (111/68 - 151/83)  BP(mean): --  RR: 18 (15 - 18)  SpO2: 96% (96% - 99%)    PHYSICAL EXAM    Gen: NAD  Abd: Soft, NT/ND, midline island dressing removed, incision CDI    I&O's Detail    I & Os for current day (as of 10 Feb 2017 07:48)  =============================================  IN:    Oral Fluid: 1840 ml    multivitamin/thiamine/folic acid in sodium chloride 0.9%: 1125 ml    IV PiggyBack: 200 ml    Total IN: 3165 ml  ---------------------------------------------  OUT:    Voided: 1200 ml    Total OUT: 1200 ml  ---------------------------------------------  Total NET: 1965 ml      LABS:                        7.4    7.2   )-----------( 129      ( 09 Feb 2017 06:48 )             25.5     09 Feb 2017 06:44    141    |  107    |  8      ----------------------------<  73     3.5     |  27     |  0.52     Ca    8.1        09 Feb 2017 06:44  Phos  3.2       09 Feb 2017 06:44  Mg     2.2       09 Feb 2017 06:44            MEDICATIONS  (STANDING):  heparin  Injectable 5000Unit(s) SubCutaneous every 8 hours  multivitamin/thiamine/folic acid in sodium chloride 0.9% 1000milliLiter(s) IV Continuous <Continuous>  ergocalciferol 09089Heql(s) Oral every week    MEDICATIONS  (PRN):  HYDROmorphone  Injectable 0.5milliGRAM(s) IV Push every 4 hours PRN Moderate Pain  HYDROmorphone  Injectable 1milliGRAM(s) IV Push every 4 hours PRN Severe Pain  ondansetron Injectable 4milliGRAM(s) IV Push every 6 hours PRN Nausea      RADIOLOGY & ADDITIONAL STUDIES:    ASSESSMENT AND PLAN

## 2017-02-11 VITALS
SYSTOLIC BLOOD PRESSURE: 115 MMHG | RESPIRATION RATE: 15 BRPM | DIASTOLIC BLOOD PRESSURE: 71 MMHG | TEMPERATURE: 98 F | HEART RATE: 72 BPM | OXYGEN SATURATION: 99 %

## 2017-02-11 LAB
ANION GAP SERPL CALC-SCNC: 8 MMOL/L — LOW (ref 9–16)
BUN SERPL-MCNC: 3 MG/DL — LOW (ref 7–23)
CALCIUM SERPL-MCNC: 8.2 MG/DL — LOW (ref 8.5–10.5)
CHLORIDE SERPL-SCNC: 108 MMOL/L — SIGNIFICANT CHANGE UP (ref 96–108)
CO2 SERPL-SCNC: 27 MMOL/L — SIGNIFICANT CHANGE UP (ref 22–31)
CREAT SERPL-MCNC: 0.5 MG/DL — SIGNIFICANT CHANGE UP (ref 0.5–1.3)
GLUCOSE SERPL-MCNC: 76 MG/DL — SIGNIFICANT CHANGE UP (ref 70–99)
HCT VFR BLD CALC: 27.7 % — LOW (ref 34.5–45)
HGB BLD-MCNC: 8.1 G/DL — LOW (ref 11.5–15.5)
MAGNESIUM SERPL-MCNC: 2 MG/DL — SIGNIFICANT CHANGE UP (ref 1.6–2.4)
MCHC RBC-ENTMCNC: 18.8 PG — LOW (ref 27–34)
MCHC RBC-ENTMCNC: 29.2 G/DL — LOW (ref 32–36)
MCV RBC AUTO: 64.1 FL — LOW (ref 80–100)
PHOSPHATE SERPL-MCNC: 4.3 MG/DL — SIGNIFICANT CHANGE UP (ref 2.5–4.5)
PLATELET # BLD AUTO: 103 K/UL — LOW (ref 150–400)
POTASSIUM SERPL-MCNC: 3.8 MMOL/L — SIGNIFICANT CHANGE UP (ref 3.5–5.3)
POTASSIUM SERPL-SCNC: 3.8 MMOL/L — SIGNIFICANT CHANGE UP (ref 3.5–5.3)
RBC # BLD: 4.32 M/UL — SIGNIFICANT CHANGE UP (ref 3.8–5.2)
RBC # FLD: 29.4 % — HIGH (ref 10.3–16.9)
SODIUM SERPL-SCNC: 143 MMOL/L — SIGNIFICANT CHANGE UP (ref 135–145)
WBC # BLD: 4.1 K/UL — SIGNIFICANT CHANGE UP (ref 3.8–10.5)
WBC # FLD AUTO: 4.1 K/UL — SIGNIFICANT CHANGE UP (ref 3.8–10.5)

## 2017-02-11 PROCEDURE — A9585: CPT

## 2017-02-11 PROCEDURE — 84295 ASSAY OF SERUM SODIUM: CPT

## 2017-02-11 PROCEDURE — 86901 BLOOD TYPING SEROLOGIC RH(D): CPT

## 2017-02-11 PROCEDURE — 83970 ASSAY OF PARATHORMONE: CPT

## 2017-02-11 PROCEDURE — 82652 VIT D 1 25-DIHYDROXY: CPT

## 2017-02-11 PROCEDURE — 83690 ASSAY OF LIPASE: CPT

## 2017-02-11 PROCEDURE — 96375 TX/PRO/DX INJ NEW DRUG ADDON: CPT | Mod: XU

## 2017-02-11 PROCEDURE — 85025 COMPLETE CBC W/AUTO DIFF WBC: CPT

## 2017-02-11 PROCEDURE — 74177 CT ABD & PELVIS W/CONTRAST: CPT

## 2017-02-11 PROCEDURE — 84132 ASSAY OF SERUM POTASSIUM: CPT

## 2017-02-11 PROCEDURE — 83735 ASSAY OF MAGNESIUM: CPT

## 2017-02-11 PROCEDURE — 80053 COMPREHEN METABOLIC PANEL: CPT

## 2017-02-11 PROCEDURE — 85027 COMPLETE CBC AUTOMATED: CPT

## 2017-02-11 PROCEDURE — 36430 TRANSFUSION BLD/BLD COMPNT: CPT

## 2017-02-11 PROCEDURE — 81025 URINE PREGNANCY TEST: CPT

## 2017-02-11 PROCEDURE — 85730 THROMBOPLASTIN TIME PARTIAL: CPT

## 2017-02-11 PROCEDURE — 36415 COLL VENOUS BLD VENIPUNCTURE: CPT

## 2017-02-11 PROCEDURE — 81003 URINALYSIS AUTO W/O SCOPE: CPT

## 2017-02-11 PROCEDURE — 85610 PROTHROMBIN TIME: CPT

## 2017-02-11 PROCEDURE — 84100 ASSAY OF PHOSPHORUS: CPT

## 2017-02-11 PROCEDURE — 80048 BASIC METABOLIC PNL TOTAL CA: CPT

## 2017-02-11 PROCEDURE — 86850 RBC ANTIBODY SCREEN: CPT

## 2017-02-11 PROCEDURE — 74182 MRI ABDOMEN W/CONTRAST: CPT

## 2017-02-11 PROCEDURE — 99285 EMERGENCY DEPT VISIT HI MDM: CPT | Mod: 25

## 2017-02-11 PROCEDURE — 82330 ASSAY OF CALCIUM: CPT

## 2017-02-11 PROCEDURE — 85018 HEMOGLOBIN: CPT

## 2017-02-11 PROCEDURE — 82310 ASSAY OF CALCIUM: CPT

## 2017-02-11 PROCEDURE — 83605 ASSAY OF LACTIC ACID: CPT

## 2017-02-11 PROCEDURE — 86900 BLOOD TYPING SEROLOGIC ABO: CPT

## 2017-02-11 PROCEDURE — P9016: CPT

## 2017-02-11 PROCEDURE — 82306 VITAMIN D 25 HYDROXY: CPT

## 2017-02-11 PROCEDURE — 71045 X-RAY EXAM CHEST 1 VIEW: CPT

## 2017-02-11 PROCEDURE — 86923 COMPATIBILITY TEST ELECTRIC: CPT

## 2017-02-11 PROCEDURE — 96374 THER/PROPH/DIAG INJ IV PUSH: CPT | Mod: XU

## 2017-02-11 RX ADMIN — HEPARIN SODIUM 5000 UNIT(S): 5000 INJECTION INTRAVENOUS; SUBCUTANEOUS at 05:15

## 2017-02-11 RX ADMIN — HEPARIN SODIUM 5000 UNIT(S): 5000 INJECTION INTRAVENOUS; SUBCUTANEOUS at 13:16

## 2017-02-11 RX ADMIN — Medication 100 MILLIGRAM(S): at 11:58

## 2017-02-11 NOTE — DISCHARGE NOTE ADULT - PATIENT PORTAL LINK FT
“You can access the [employer] Patient Portal, offered by [facility], by registering with the following website: [email or web address]”

## 2017-02-11 NOTE — PROGRESS NOTE ADULT - SUBJECTIVE AND OBJECTIVE BOX
O/N: MARICRUZ  2/10: +flatus, no BM, toelrating CLD INTERVAL HPI/OVERNIGHT EVENTS: O/N: MARICRUZ  2/10: +flatus, no BM, toelrating CLD    STATUS POST:  diagnostic lap converted to ex lap with reduction of reyes's hernia and primary repair of mesenteric defect. NO bowel resected    POST OPERATIVE DAY #: 4    SUBJECTIVE:   Pain controlled. Denies CP/SOB/N/V/palpitations  Flatus: [X ] YES [ ] NO             Bowel Movement: [X ] YES [ ] NO      MEDICATIONS  (STANDING):  heparin  Injectable 5000Unit(s) SubCutaneous every 8 hours  ergocalciferol 85542Lrea(s) Oral every week  senna 2Tablet(s) Oral at bedtime  docusate sodium 100milliGRAM(s) Oral daily    MEDICATIONS  (PRN):  ondansetron Injectable 4milliGRAM(s) IV Push every 6 hours PRN Nausea  oxyCODONE  5 mG/acetaminophen 325 mG 1Tablet(s) Oral every 4 hours PRN Moderate Pain (4 - 6)  oxyCODONE  5 mG/acetaminophen 325 mG 2Tablet(s) Oral every 4 hours PRN Severe Pain (7 - 10)      Vital Signs Last 24 Hrs  T(C): 36.5, Max: 36.6 (02-10 @ 12:21)  T(F): 97.7, Max: 97.9 (02-10 @ 12:21)  HR: 58 (58 - 73)  BP: 124/72 (108/70 - 124/72)  BP(mean): --  RR: 17 (16 - 17)  SpO2: 96% (96% - 97%)    Physical Exam:    General: A&Ox3, NAD.   CV: RRR  Pulm: nonlabored breathing  Abd: soft, NTND, no guarding, no rebound. Incision: CDI.   Ext: No edema. WWP.       I&O's Detail    I & Os for current day (as of 11 Feb 2017 09:08)  =============================================  IN:    Oral Fluid: 480 ml    IV PiggyBack: 300 ml    Total IN: 780 ml  ---------------------------------------------  OUT:    Voided: 2050 ml    Total OUT: 2050 ml  ---------------------------------------------  Total NET: -1270 ml      LABS:                        8.1    4.1   )-----------( 103      ( 11 Feb 2017 07:45 )             27.7     11 Feb 2017 07:45    143    |  108    |  3      ----------------------------<  76     3.8     |  27     |  0.50     Ca    8.2        11 Feb 2017 07:45  Phos  4.3       11 Feb 2017 07:45  Mg     2.0       11 Feb 2017 07:45            RADIOLOGY & ADDITIONAL STUDIES:

## 2017-02-11 NOTE — DISCHARGE NOTE ADULT - MEDICATION SUMMARY - MEDICATIONS TO TAKE
I will START or STAY ON the medications listed below when I get home from the hospital:    Percocet 5/325 oral tablet  -- 1 tab(s) by mouth every 4 hours, As needed, Moderate Pain (4 - 6) -for severe pain MDD:8tabs  -- Indication: For Pain control

## 2017-02-11 NOTE — DISCHARGE NOTE ADULT - PLAN OF CARE
Full recovery -Continue a regular diet as tolerated  -Activity: no heavy lifting or abdominal exercises for one month  -You may shower but no soaking baths, no swimming pools  -Notify physician for fever greater than 101F, worsening abdominal pain, bleeding or drainage from incision sites, shortness or breath, or chest pain.  -Follow-up with Dr. Ocampo in 1 week. Call the office to make an appointment Follow-up with Dr. Caban in 2 weeks. Call the office to make an appointment.

## 2017-02-11 NOTE — DISCHARGE NOTE ADULT - CARE PROVIDER_API CALL
Matias Ocampo), Surgery  [address] [address]  Phone: [phone]  Fax: [phone]    Hakeem Caban), Urology  [address] [address]  Phone: [phone]  Fax: [phone]

## 2017-02-11 NOTE — DISCHARGE NOTE ADULT - CARE PLAN
Principal Discharge DX:	Internal hernia  Goal:	Full recovery Principal Discharge DX:	Internal hernia  Goal:	Full recovery  Instructions for follow-up, activity and diet:	-Continue a regular diet as tolerated  -Activity: no heavy lifting or abdominal exercises for one month  -You may shower but no soaking baths, no swimming pools  -Notify physician for fever greater than 101F, worsening abdominal pain, bleeding or drainage from incision sites, shortness or breath, or chest pain.  -Follow-up with Dr. Ocampo in 1 week. Call the office to make an appointment  Secondary Diagnosis:	Renal mass  Instructions for follow-up, activity and diet:	Follow-up with Dr. Caban in 2 weeks. Call the office to make an appointment.

## 2017-02-11 NOTE — PROGRESS NOTE ADULT - ASSESSMENT
47 yo F s/p above procedure  CLD/IVF  Pain/nausea control  DVT ppx  AM labs  IS  OOBA 47 yo F s/p above procedure  Advance to regular diet  Pain/nausea control  DVT ppx  AM labs  IS  OOBA

## 2017-02-11 NOTE — DISCHARGE NOTE ADULT - CARE PROVIDERS DIRECT ADDRESSES
[email or web address],DirectAddress_Unknown

## 2017-02-11 NOTE — DISCHARGE NOTE ADULT - HOSPITAL COURSE
46yoF p/w abdominal pain found to have an internal hernia. Pt was taken to OR and underwent an exploratory laparotomy with reduction of reyes's hernia and primary repair of mesenteric defect. Procedure was well-tolerated and post-op course was uneventful.  was consulted for incidental finding of renal mass. At time of discharge the patient was stable, improved, and tolerating a diet. She was discharged home with instructions for follow-up.

## 2017-02-14 DIAGNOSIS — Z98.84 BARIATRIC SURGERY STATUS: ICD-10-CM

## 2017-02-14 DIAGNOSIS — R10.9 UNSPECIFIED ABDOMINAL PAIN: ICD-10-CM

## 2017-02-14 DIAGNOSIS — C64.2 MALIGNANT NEOPLASM OF LEFT KIDNEY, EXCEPT RENAL PELVIS: ICD-10-CM

## 2017-02-14 DIAGNOSIS — Z53.31 LAPAROSCOPIC SURGICAL PROCEDURE CONVERTED TO OPEN PROCEDURE: ICD-10-CM

## 2017-02-14 DIAGNOSIS — Z28.21 IMMUNIZATION NOT CARRIED OUT BECAUSE OF PATIENT REFUSAL: ICD-10-CM

## 2017-02-14 DIAGNOSIS — D50.9 IRON DEFICIENCY ANEMIA, UNSPECIFIED: ICD-10-CM

## 2017-02-14 DIAGNOSIS — E66.01 MORBID (SEVERE) OBESITY DUE TO EXCESS CALORIES: ICD-10-CM

## 2017-02-14 DIAGNOSIS — K56.2 VOLVULUS: ICD-10-CM

## 2017-02-22 ENCOUNTER — APPOINTMENT (OUTPATIENT)
Dept: BARIATRICS | Facility: CLINIC | Age: 47
End: 2017-02-22

## 2017-02-22 ENCOUNTER — LABORATORY RESULT (OUTPATIENT)
Age: 47
End: 2017-02-22

## 2017-02-22 VITALS
OXYGEN SATURATION: 99 % | WEIGHT: 145.05 LBS | SYSTOLIC BLOOD PRESSURE: 108 MMHG | HEART RATE: 78 BPM | TEMPERATURE: 98.6 F | BODY MASS INDEX: 28.48 KG/M2 | HEIGHT: 60 IN | DIASTOLIC BLOOD PRESSURE: 72 MMHG

## 2017-02-22 DIAGNOSIS — E66.01 MORBID (SEVERE) OBESITY DUE TO EXCESS CALORIES: ICD-10-CM

## 2017-02-25 ENCOUNTER — TRANSCRIPTION ENCOUNTER (OUTPATIENT)
Age: 47
End: 2017-02-25

## 2017-02-27 ENCOUNTER — OTHER (OUTPATIENT)
Age: 47
End: 2017-02-27

## 2017-02-27 DIAGNOSIS — D47.3 ESSENTIAL (HEMORRHAGIC) THROMBOCYTHEMIA: ICD-10-CM

## 2017-02-28 ENCOUNTER — LABORATORY RESULT (OUTPATIENT)
Age: 47
End: 2017-02-28

## 2017-03-01 ENCOUNTER — OTHER (OUTPATIENT)
Age: 47
End: 2017-03-01

## 2017-03-06 ENCOUNTER — APPOINTMENT (OUTPATIENT)
Dept: UROLOGY | Facility: CLINIC | Age: 47
End: 2017-03-06

## 2017-03-06 ENCOUNTER — OTHER (OUTPATIENT)
Age: 47
End: 2017-03-06

## 2017-03-06 VITALS
BODY MASS INDEX: 28.47 KG/M2 | HEIGHT: 60 IN | WEIGHT: 145 LBS | SYSTOLIC BLOOD PRESSURE: 115 MMHG | HEART RATE: 86 BPM | TEMPERATURE: 98.6 F | DIASTOLIC BLOOD PRESSURE: 69 MMHG

## 2017-03-06 DIAGNOSIS — Z84.1 FAMILY HISTORY OF DISORDERS OF KIDNEY AND URETER: ICD-10-CM

## 2017-03-06 RX ORDER — OMEPRAZOLE 40 MG/1
40 CAPSULE, DELAYED RELEASE ORAL
Qty: 30 | Refills: 0 | Status: ACTIVE | COMMUNITY
Start: 2017-03-06 | End: 1900-01-01

## 2017-03-06 RX ORDER — OXYCODONE AND ACETAMINOPHEN 5; 325 MG/1; MG/1
5-325 TABLET ORAL
Qty: 18 | Refills: 0 | Status: DISCONTINUED | COMMUNITY
Start: 2017-02-11 | End: 2017-03-06

## 2017-03-07 PROBLEM — Z84.1 FAMILY HISTORY OF KIDNEY STONES: Status: ACTIVE | Noted: 2017-03-07

## 2017-03-08 LAB
25(OH)D3 SERPL-MCNC: 17.9 NG/ML
A-TOCOPHEROL VIT E SERPL-MCNC: 7.2 MG/L
ALBUMIN SERPL ELPH-MCNC: 4 G/DL
ALP BLD-CCNC: 66 U/L
ALT SERPL-CCNC: 17 U/L
ANION GAP SERPL CALC-SCNC: 13 MMOL/L
AST SERPL-CCNC: 19 U/L
BASOPHILS # BLD AUTO: 0.09 K/UL
BASOPHILS # BLD AUTO: 0.12 K/UL
BASOPHILS NFR BLD AUTO: 1.7 %
BASOPHILS NFR BLD AUTO: 1.8 %
BETA+GAMMA TOCOPHEROL SERPL-MCNC: 1.5 MG/L
BILIRUB SERPL-MCNC: 0.4 MG/DL
BUN SERPL-MCNC: 9 MG/DL
CA-I SERPL-SCNC: 1.27 MMOL/L
CALCIUM SERPL-MCNC: 9.3 MG/DL
CALCIUM SERPL-MCNC: 9.3 MG/DL
CHLORIDE SERPL-SCNC: 105 MMOL/L
CHOLEST SERPL-MCNC: 150 MG/DL
CHOLEST/HDLC SERPL: 2.2 RATIO
CO2 SERPL-SCNC: 24 MMOL/L
CREAT SERPL-MCNC: 0.61 MG/DL
EOSINOPHIL # BLD AUTO: 0.05 K/UL
EOSINOPHIL # BLD AUTO: 0.12 K/UL
EOSINOPHIL NFR BLD AUTO: 0.9 %
EOSINOPHIL NFR BLD AUTO: 1.8 %
FOLATE SERPL-MCNC: 15.4 NG/ML
GLUCOSE SERPL-MCNC: 87 MG/DL
HBA1C MFR BLD HPLC: 5.6 %
HCT VFR BLD CALC: 32.5 %
HCT VFR BLD CALC: 32.8 %
HDLC SERPL-MCNC: 69 MG/DL
HGB BLD-MCNC: 9.4 G/DL
HGB BLD-MCNC: 9.6 G/DL
IRON SATN MFR SERPL: 9 %
IRON SERPL-MCNC: 37 UG/DL
LDLC SERPL CALC-MCNC: 66 MG/DL
LYMPHOCYTES # BLD AUTO: 0.94 K/UL
LYMPHOCYTES # BLD AUTO: 1.06 K/UL
LYMPHOCYTES NFR BLD AUTO: 14.2 %
LYMPHOCYTES NFR BLD AUTO: 19.1 %
MAN DIFF?: NORMAL
MAN DIFF?: NORMAL
MCHC RBC-ENTMCNC: 20.4 PG
MCHC RBC-ENTMCNC: 20.7 PG
MCHC RBC-ENTMCNC: 28.7 GM/DL
MCHC RBC-ENTMCNC: 29.5 GM/DL
MCV RBC AUTO: 70 FL
MCV RBC AUTO: 71.3 FL
MONOCYTES # BLD AUTO: 0.23 K/UL
MONOCYTES # BLD AUTO: 0.29 K/UL
MONOCYTES NFR BLD AUTO: 3.5 %
MONOCYTES NFR BLD AUTO: 5.2 %
NEUTROPHILS # BLD AUTO: 4.04 K/UL
NEUTROPHILS # BLD AUTO: 5.09 K/UL
NEUTROPHILS NFR BLD AUTO: 73.1 %
NEUTROPHILS NFR BLD AUTO: 77 %
PARATHYROID HORMONE INTACT: 51 PG/ML
PLATELET # BLD AUTO: 805 K/UL
PLATELET # BLD AUTO: 897 K/UL
POTASSIUM SERPL-SCNC: 4.3 MMOL/L
PREALB SERPL NEPH-MCNC: 23 MG/DL
PROT SERPL-MCNC: 7.3 G/DL
RBC # BLD: 4.6 M/UL
RBC # BLD: 4.64 M/UL
RBC # FLD: NORMAL
RBC # FLD: NORMAL
SODIUM SERPL-SCNC: 142 MMOL/L
TIBC SERPL-MCNC: 420 UG/DL
TRIGL SERPL-MCNC: 76 MG/DL
TSH SERPL-ACNC: 1.5 UIU/ML
UIBC SERPL-MCNC: 383 UG/DL
VIT A SERPL-MCNC: 36 UG/DL
VIT B1 SERPL-MCNC: 176.4 NMOL/L
VIT B12 SERPL-MCNC: 604 PG/ML
WBC # FLD AUTO: 5.53 K/UL
WBC # FLD AUTO: 6.61 K/UL
ZINC SERPL-MCNC: 52 UG/DL

## 2017-03-27 ENCOUNTER — APPOINTMENT (OUTPATIENT)
Dept: HEMATOLOGY ONCOLOGY | Facility: CLINIC | Age: 47
End: 2017-03-27

## 2017-03-27 ENCOUNTER — APPOINTMENT (OUTPATIENT)
Dept: NEPHROLOGY | Facility: CLINIC | Age: 47
End: 2017-03-27

## 2017-03-27 VITALS
TEMPERATURE: 97.6 F | OXYGEN SATURATION: 98 % | HEIGHT: 60 IN | SYSTOLIC BLOOD PRESSURE: 122 MMHG | BODY MASS INDEX: 29.11 KG/M2 | WEIGHT: 148.25 LBS | DIASTOLIC BLOOD PRESSURE: 74 MMHG | RESPIRATION RATE: 14 BRPM | HEART RATE: 81 BPM

## 2017-03-27 VITALS — HEART RATE: 84 BPM | SYSTOLIC BLOOD PRESSURE: 120 MMHG | DIASTOLIC BLOOD PRESSURE: 70 MMHG

## 2017-03-27 RX ORDER — ERGOCALCIFEROL 1.25 MG/1
1.25 MG CAPSULE, LIQUID FILLED ORAL
Qty: 4 | Refills: 5 | Status: ACTIVE | COMMUNITY
Start: 2017-03-27 | End: 1900-01-01

## 2017-03-29 ENCOUNTER — LABORATORY RESULT (OUTPATIENT)
Age: 47
End: 2017-03-29

## 2017-03-29 ENCOUNTER — APPOINTMENT (OUTPATIENT)
Dept: INTERNAL MEDICINE | Facility: CLINIC | Age: 47
End: 2017-03-29

## 2017-03-29 VITALS
HEART RATE: 69 BPM | DIASTOLIC BLOOD PRESSURE: 80 MMHG | TEMPERATURE: 97.7 F | OXYGEN SATURATION: 99 % | SYSTOLIC BLOOD PRESSURE: 120 MMHG | WEIGHT: 148 LBS | BODY MASS INDEX: 28.9 KG/M2

## 2017-03-29 DIAGNOSIS — N28.89 OTHER SPECIFIED DISORDERS OF KIDNEY AND URETER: ICD-10-CM

## 2017-04-03 ENCOUNTER — APPOINTMENT (OUTPATIENT)
Dept: INTERNAL MEDICINE | Facility: CLINIC | Age: 47
End: 2017-04-03

## 2017-04-03 DIAGNOSIS — Z11.1 ENCOUNTER FOR SCREENING FOR RESPIRATORY TUBERCULOSIS: ICD-10-CM

## 2017-04-03 PROBLEM — N28.89 LEFT RENAL MASS: Status: ACTIVE | Noted: 2017-03-06

## 2017-04-03 LAB
ALBUMIN SERPL ELPH-MCNC: 3.9 G/DL
ALP BLD-CCNC: 82 U/L
ALT SERPL-CCNC: 13 U/L
ANION GAP SERPL CALC-SCNC: 17 MMOL/L
APPEARANCE: CLEAR
APTT BLD: 28.7 SEC
AST SERPL-CCNC: 22 U/L
BASOPHILS # BLD AUTO: 0.04 K/UL
BASOPHILS NFR BLD AUTO: 0.8 %
BILIRUB SERPL-MCNC: 0.3 MG/DL
BILIRUBIN URINE: NEGATIVE
BLOOD URINE: NEGATIVE
BUN SERPL-MCNC: 9 MG/DL
CALCIUM SERPL-MCNC: 8.8 MG/DL
CHLORIDE SERPL-SCNC: 104 MMOL/L
CO2 SERPL-SCNC: 22 MMOL/L
COLOR: YELLOW
CREAT SERPL-MCNC: 0.52 MG/DL
EOSINOPHIL # BLD AUTO: 0.04 K/UL
EOSINOPHIL NFR BLD AUTO: 0.8 %
GLUCOSE QUALITATIVE U: NORMAL MG/DL
GLUCOSE SERPL-MCNC: 86 MG/DL
HCG SERPL-MCNC: <1 MIU/ML
HCT VFR BLD CALC: 33.5 %
HGB BLD-MCNC: 10.1 G/DL
IMM GRANULOCYTES NFR BLD AUTO: 0.2 %
INR PPP: 0.95 RATIO
KETONES URINE: NEGATIVE
LEUKOCYTE ESTERASE URINE: NEGATIVE
LYMPHOCYTES # BLD AUTO: 1.12 K/UL
LYMPHOCYTES NFR BLD AUTO: 22.4 %
MAN DIFF?: NORMAL
MCHC RBC-ENTMCNC: 23.1 PG
MCHC RBC-ENTMCNC: 30.1 GM/DL
MCV RBC AUTO: 76.7 FL
MONOCYTES # BLD AUTO: 0.59 K/UL
MONOCYTES NFR BLD AUTO: 11.8 %
NEUTROPHILS # BLD AUTO: 3.21 K/UL
NEUTROPHILS NFR BLD AUTO: 64 %
NITRITE URINE: NEGATIVE
PH URINE: 6.5
PLATELET # BLD AUTO: 275 K/UL
POTASSIUM SERPL-SCNC: 4.2 MMOL/L
PROT SERPL-MCNC: 7 G/DL
PROTEIN URINE: NEGATIVE MG/DL
PT BLD: 10.7 SEC
RBC # BLD: 4.37 M/UL
RBC # FLD: NORMAL
SODIUM SERPL-SCNC: 143 MMOL/L
SPECIFIC GRAVITY URINE: 1.02
UROBILINOGEN URINE: 1 MG/DL
WBC # FLD AUTO: 5.01 K/UL

## 2017-04-12 ENCOUNTER — OUTPATIENT (OUTPATIENT)
Dept: OUTPATIENT SERVICES | Facility: HOSPITAL | Age: 47
LOS: 1 days | End: 2017-04-12
Payer: COMMERCIAL

## 2017-04-12 DIAGNOSIS — K90.9 INTESTINAL MALABSORPTION, UNSPECIFIED: ICD-10-CM

## 2017-04-12 DIAGNOSIS — Z98.890 OTHER SPECIFIED POSTPROCEDURAL STATES: Chronic | ICD-10-CM

## 2017-04-12 DIAGNOSIS — D50.9 IRON DEFICIENCY ANEMIA, UNSPECIFIED: ICD-10-CM

## 2017-04-12 PROCEDURE — 96365 THER/PROPH/DIAG IV INF INIT: CPT

## 2017-04-12 RX ORDER — FERUMOXYTOL 510 MG/17ML
510 INJECTION INTRAVENOUS ONCE
Qty: 0 | Refills: 0 | Status: DISCONTINUED | OUTPATIENT
Start: 2017-04-12 | End: 2017-04-27

## 2017-04-14 RX ORDER — FERUMOXYTOL 510 MG/17ML
510 INJECTION INTRAVENOUS ONCE
Qty: 0 | Refills: 0 | Status: DISCONTINUED | OUTPATIENT
Start: 2017-04-17 | End: 2017-05-02

## 2017-04-17 ENCOUNTER — OUTPATIENT (OUTPATIENT)
Dept: OUTPATIENT SERVICES | Facility: HOSPITAL | Age: 47
LOS: 1 days | End: 2017-04-17
Payer: COMMERCIAL

## 2017-04-17 DIAGNOSIS — Z98.890 OTHER SPECIFIED POSTPROCEDURAL STATES: Chronic | ICD-10-CM

## 2017-04-17 DIAGNOSIS — K90.9 INTESTINAL MALABSORPTION, UNSPECIFIED: ICD-10-CM

## 2017-04-17 DIAGNOSIS — D50.9 IRON DEFICIENCY ANEMIA, UNSPECIFIED: ICD-10-CM

## 2017-04-17 PROCEDURE — 96365 THER/PROPH/DIAG IV INF INIT: CPT

## 2017-04-19 ENCOUNTER — RESULT REVIEW (OUTPATIENT)
Age: 47
End: 2017-04-19

## 2017-04-19 VITALS
OXYGEN SATURATION: 98 % | SYSTOLIC BLOOD PRESSURE: 126 MMHG | TEMPERATURE: 97 F | RESPIRATION RATE: 16 BRPM | HEART RATE: 69 BPM | DIASTOLIC BLOOD PRESSURE: 87 MMHG | HEIGHT: 61 IN | WEIGHT: 147.71 LBS

## 2017-04-19 RX ORDER — FERROUS SULFATE 325(65) MG
0 TABLET ORAL
Qty: 0 | Refills: 0 | COMMUNITY

## 2017-04-19 RX ORDER — OMEPRAZOLE 10 MG/1
1 CAPSULE, DELAYED RELEASE ORAL
Qty: 0 | Refills: 0 | COMMUNITY

## 2017-04-19 RX ORDER — ERGOCALCIFEROL 1.25 MG/1
1 CAPSULE ORAL
Qty: 0 | Refills: 0 | COMMUNITY

## 2017-04-20 ENCOUNTER — INPATIENT (INPATIENT)
Facility: HOSPITAL | Age: 47
LOS: 2 days | Discharge: HOME | DRG: 658 | End: 2017-04-23
Attending: UROLOGY | Admitting: UROLOGY
Payer: COMMERCIAL

## 2017-04-20 DIAGNOSIS — Z98.51 TUBAL LIGATION STATUS: Chronic | ICD-10-CM

## 2017-04-20 DIAGNOSIS — Z98.890 OTHER SPECIFIED POSTPROCEDURAL STATES: Chronic | ICD-10-CM

## 2017-04-20 DIAGNOSIS — N28.89 OTHER SPECIFIED DISORDERS OF KIDNEY AND URETER: ICD-10-CM

## 2017-04-20 LAB
ANION GAP SERPL CALC-SCNC: 8 MMOL/L — LOW (ref 9–16)
BASOPHILS NFR BLD AUTO: 0.1 % — SIGNIFICANT CHANGE UP (ref 0–2)
BUN SERPL-MCNC: 6 MG/DL — LOW (ref 7–23)
CALCIUM SERPL-MCNC: 8.1 MG/DL — LOW (ref 8.5–10.5)
CHLORIDE SERPL-SCNC: 105 MMOL/L — SIGNIFICANT CHANGE UP (ref 96–108)
CO2 SERPL-SCNC: 25 MMOL/L — SIGNIFICANT CHANGE UP (ref 22–31)
CREAT SERPL-MCNC: 0.46 MG/DL — LOW (ref 0.5–1.3)
EOSINOPHIL NFR BLD AUTO: 0.1 % — SIGNIFICANT CHANGE UP (ref 0–6)
GLUCOSE SERPL-MCNC: 149 MG/DL — HIGH (ref 70–99)
HCT VFR BLD CALC: 29.8 % — LOW (ref 34.5–45)
HGB BLD-MCNC: 9.7 G/DL — LOW (ref 11.5–15.5)
LYMPHOCYTES # BLD AUTO: 3.7 % — LOW (ref 13–44)
MCHC RBC-ENTMCNC: 24.9 PG — LOW (ref 27–34)
MCHC RBC-ENTMCNC: 32.6 G/DL — SIGNIFICANT CHANGE UP (ref 32–36)
MCV RBC AUTO: 76.4 FL — LOW (ref 80–100)
MONOCYTES NFR BLD AUTO: 3 % — SIGNIFICANT CHANGE UP (ref 2–14)
NEUTROPHILS NFR BLD AUTO: 93.1 % — HIGH (ref 43–77)
PLATELET # BLD AUTO: 236 K/UL — SIGNIFICANT CHANGE UP (ref 150–400)
POTASSIUM SERPL-MCNC: 3.4 MMOL/L — LOW (ref 3.5–5.3)
POTASSIUM SERPL-SCNC: 3.4 MMOL/L — LOW (ref 3.5–5.3)
RBC # BLD: 3.9 M/UL — SIGNIFICANT CHANGE UP (ref 3.8–5.2)
RBC # FLD: 21.9 % — HIGH (ref 10.3–16.9)
SODIUM SERPL-SCNC: 138 MMOL/L — SIGNIFICANT CHANGE UP (ref 135–145)
WBC # BLD: 13.6 K/UL — HIGH (ref 3.8–10.5)
WBC # FLD AUTO: 13.6 K/UL — HIGH (ref 3.8–10.5)

## 2017-04-20 PROCEDURE — 50240 NEPHRECTOMY PARTIAL: CPT | Mod: LT

## 2017-04-20 PROCEDURE — 71010: CPT | Mod: 26

## 2017-04-20 PROCEDURE — 76998 US GUIDE INTRAOP: CPT | Mod: 26

## 2017-04-20 RX ORDER — SODIUM CHLORIDE 9 MG/ML
1000 INJECTION INTRAMUSCULAR; INTRAVENOUS; SUBCUTANEOUS
Qty: 0 | Refills: 0 | Status: DISCONTINUED | OUTPATIENT
Start: 2017-04-20 | End: 2017-04-22

## 2017-04-20 RX ORDER — ACETAMINOPHEN 500 MG
650 TABLET ORAL EVERY 6 HOURS
Qty: 0 | Refills: 0 | Status: DISCONTINUED | OUTPATIENT
Start: 2017-04-20 | End: 2017-04-23

## 2017-04-20 RX ORDER — MORPHINE SULFATE 50 MG/1
4 CAPSULE, EXTENDED RELEASE ORAL EVERY 4 HOURS
Qty: 0 | Refills: 0 | Status: DISCONTINUED | OUTPATIENT
Start: 2017-04-20 | End: 2017-04-20

## 2017-04-20 RX ORDER — ASPIRIN/CALCIUM CARB/MAGNESIUM 324 MG
1 TABLET ORAL
Qty: 0 | Refills: 0 | COMMUNITY

## 2017-04-20 RX ORDER — HYDROMORPHONE HYDROCHLORIDE 2 MG/ML
1 INJECTION INTRAMUSCULAR; INTRAVENOUS; SUBCUTANEOUS EVERY 4 HOURS
Qty: 0 | Refills: 0 | Status: DISCONTINUED | OUTPATIENT
Start: 2017-04-20 | End: 2017-04-23

## 2017-04-20 RX ORDER — BUPIVACAINE 13.3 MG/ML
20 INJECTION, SUSPENSION, LIPOSOMAL INFILTRATION ONCE
Qty: 0 | Refills: 0 | Status: DISCONTINUED | OUTPATIENT
Start: 2017-04-20 | End: 2017-04-23

## 2017-04-20 RX ORDER — OXYBUTYNIN CHLORIDE 5 MG
5 TABLET ORAL EVERY 8 HOURS
Qty: 0 | Refills: 0 | Status: DISCONTINUED | OUTPATIENT
Start: 2017-04-20 | End: 2017-04-23

## 2017-04-20 RX ORDER — DOCUSATE SODIUM 100 MG
100 CAPSULE ORAL THREE TIMES A DAY
Qty: 0 | Refills: 0 | Status: DISCONTINUED | OUTPATIENT
Start: 2017-04-20 | End: 2017-04-23

## 2017-04-20 RX ORDER — IRON SUCROSE 20 MG/ML
1 INJECTION, SOLUTION INTRAVENOUS
Qty: 0 | Refills: 0 | COMMUNITY

## 2017-04-20 RX ORDER — PANTOPRAZOLE SODIUM 20 MG/1
40 TABLET, DELAYED RELEASE ORAL
Qty: 0 | Refills: 0 | Status: DISCONTINUED | OUTPATIENT
Start: 2017-04-20 | End: 2017-04-23

## 2017-04-20 RX ORDER — DOCUSATE SODIUM 100 MG
100 CAPSULE ORAL THREE TIMES A DAY
Qty: 0 | Refills: 0 | Status: DISCONTINUED | OUTPATIENT
Start: 2017-04-20 | End: 2017-04-20

## 2017-04-20 RX ORDER — POTASSIUM CHLORIDE 20 MEQ
10 PACKET (EA) ORAL
Qty: 0 | Refills: 0 | Status: COMPLETED | OUTPATIENT
Start: 2017-04-20 | End: 2017-04-20

## 2017-04-20 RX ORDER — METOCLOPRAMIDE HCL 10 MG
10 TABLET ORAL ONCE
Qty: 0 | Refills: 0 | Status: COMPLETED | OUTPATIENT
Start: 2017-04-20 | End: 2017-04-20

## 2017-04-20 RX ORDER — FERROUS SULFATE 325(65) MG
325 TABLET ORAL DAILY
Qty: 0 | Refills: 0 | Status: DISCONTINUED | OUTPATIENT
Start: 2017-04-20 | End: 2017-04-23

## 2017-04-20 RX ORDER — ONDANSETRON 8 MG/1
4 TABLET, FILM COATED ORAL EVERY 6 HOURS
Qty: 0 | Refills: 0 | Status: DISCONTINUED | OUTPATIENT
Start: 2017-04-20 | End: 2017-04-23

## 2017-04-20 RX ORDER — CEFAZOLIN SODIUM 1 G
1000 VIAL (EA) INJECTION EVERY 8 HOURS
Qty: 0 | Refills: 0 | Status: COMPLETED | OUTPATIENT
Start: 2017-04-20 | End: 2017-04-23

## 2017-04-20 RX ORDER — SENNA PLUS 8.6 MG/1
2 TABLET ORAL AT BEDTIME
Qty: 0 | Refills: 0 | Status: DISCONTINUED | OUTPATIENT
Start: 2017-04-20 | End: 2017-04-23

## 2017-04-20 RX ADMIN — HYDROMORPHONE HYDROCHLORIDE 1 MILLIGRAM(S): 2 INJECTION INTRAMUSCULAR; INTRAVENOUS; SUBCUTANEOUS at 19:45

## 2017-04-20 RX ADMIN — MORPHINE SULFATE 4 MILLIGRAM(S): 50 CAPSULE, EXTENDED RELEASE ORAL at 20:08

## 2017-04-20 RX ADMIN — HYDROMORPHONE HYDROCHLORIDE 1 MILLIGRAM(S): 2 INJECTION INTRAMUSCULAR; INTRAVENOUS; SUBCUTANEOUS at 20:08

## 2017-04-20 RX ADMIN — Medication 100 MILLIGRAM(S): at 22:18

## 2017-04-20 RX ADMIN — MORPHINE SULFATE 4 MILLIGRAM(S): 50 CAPSULE, EXTENDED RELEASE ORAL at 19:15

## 2017-04-20 RX ADMIN — Medication 100 MILLIEQUIVALENT(S): at 23:29

## 2017-04-20 RX ADMIN — Medication 10 MILLIGRAM(S): at 22:45

## 2017-04-20 RX ADMIN — ONDANSETRON 4 MILLIGRAM(S): 8 TABLET, FILM COATED ORAL at 21:14

## 2017-04-20 RX ADMIN — Medication 100 MILLIEQUIVALENT(S): at 22:19

## 2017-04-20 NOTE — ASU PATIENT PROFILE, ADULT - PSH
H/O abdominoplasty    H/O gastric bypass    H/O tubal ligation    History of breast surgery  breast implant bilateral

## 2017-04-20 NOTE — PROGRESS NOTE ADULT - SUBJECTIVE AND OBJECTIVE BOX
UROLOGY POST OP NOTE (PAGER # [phone])    PROCEDURE: Left Open Partial nephrectomy    T(C): 37.1, Max: 37.1 (04-20 @ 18:35)  HR: 86 (69 - 86)  BP: 120/65 (120/63 - 150/45)  RR: 15 (15 - 17)  SpO2: 100% (98% - 100%)  Wt(kg): --  UO: 125 cc  DENNIS output: 30 cc serosanguinous    SUBJECTIVE: pain controlled. No N/V, No CP/SOB    ON PE: alert and awake    Abdomen: dressing clean and dry, mod tender, DENNIS intact    : FC intact, urine clear                          9.7    13.6  )-----------( 236      ( 20 Apr 2017 19:14 )             29.8     04-20    138  |  105  |  6<L>  ----------------------------<  149<H>  3.4<L>   |  25  |  0.46<L>    Ca    8.1<L>      20 Apr 2017 19:14

## 2017-04-21 LAB
ANION GAP SERPL CALC-SCNC: 8 MMOL/L — LOW (ref 9–16)
BLD GP AB SCN SERPL QL: NEGATIVE — SIGNIFICANT CHANGE UP
BUN SERPL-MCNC: 6 MG/DL — LOW (ref 7–23)
CALCIUM SERPL-MCNC: 8.3 MG/DL — LOW (ref 8.5–10.5)
CHLORIDE SERPL-SCNC: 107 MMOL/L — SIGNIFICANT CHANGE UP (ref 96–108)
CO2 SERPL-SCNC: 24 MMOL/L — SIGNIFICANT CHANGE UP (ref 22–31)
CREAT FLD-MCNC: 0.8 MG/DL — SIGNIFICANT CHANGE UP
CREAT SERPL-MCNC: 0.48 MG/DL — LOW (ref 0.5–1.3)
GLUCOSE SERPL-MCNC: 146 MG/DL — HIGH (ref 70–99)
HCT VFR BLD CALC: 31.8 % — LOW (ref 34.5–45)
HGB BLD-MCNC: 10 G/DL — LOW (ref 11.5–15.5)
MAGNESIUM SERPL-MCNC: 1.7 MG/DL — SIGNIFICANT CHANGE UP (ref 1.6–2.4)
MCHC RBC-ENTMCNC: 24.4 PG — LOW (ref 27–34)
MCHC RBC-ENTMCNC: 31.4 G/DL — LOW (ref 32–36)
MCV RBC AUTO: 77.8 FL — LOW (ref 80–100)
PHOSPHATE SERPL-MCNC: 3.8 MG/DL — SIGNIFICANT CHANGE UP (ref 2.5–4.5)
PLATELET # BLD AUTO: 251 K/UL — SIGNIFICANT CHANGE UP (ref 150–400)
POTASSIUM SERPL-MCNC: 4.5 MMOL/L — SIGNIFICANT CHANGE UP (ref 3.5–5.3)
POTASSIUM SERPL-SCNC: 4.5 MMOL/L — SIGNIFICANT CHANGE UP (ref 3.5–5.3)
RBC # BLD: 4.09 M/UL — SIGNIFICANT CHANGE UP (ref 3.8–5.2)
RBC # FLD: 21.8 % — HIGH (ref 10.3–16.9)
RH IG SCN BLD-IMP: POSITIVE — SIGNIFICANT CHANGE UP
SODIUM SERPL-SCNC: 139 MMOL/L — SIGNIFICANT CHANGE UP (ref 135–145)
WBC # BLD: 12.9 K/UL — HIGH (ref 3.8–10.5)
WBC # FLD AUTO: 12.9 K/UL — HIGH (ref 3.8–10.5)

## 2017-04-21 RX ORDER — SIMETHICONE 80 MG/1
80 TABLET, CHEWABLE ORAL AT BEDTIME
Qty: 0 | Refills: 0 | Status: DISCONTINUED | OUTPATIENT
Start: 2017-04-21 | End: 2017-04-23

## 2017-04-21 RX ORDER — MAGNESIUM OXIDE 400 MG ORAL TABLET 241.3 MG
400 TABLET ORAL ONCE
Qty: 0 | Refills: 0 | Status: COMPLETED | OUTPATIENT
Start: 2017-04-21 | End: 2017-04-21

## 2017-04-21 RX ADMIN — Medication 100 MILLIEQUIVALENT(S): at 00:39

## 2017-04-21 RX ADMIN — Medication 100 MILLIGRAM(S): at 05:08

## 2017-04-21 RX ADMIN — Medication 100 MILLIGRAM(S): at 13:55

## 2017-04-21 RX ADMIN — Medication 100 MILLIGRAM(S): at 21:05

## 2017-04-21 RX ADMIN — SIMETHICONE 80 MILLIGRAM(S): 80 TABLET, CHEWABLE ORAL at 21:05

## 2017-04-21 RX ADMIN — PANTOPRAZOLE SODIUM 40 MILLIGRAM(S): 20 TABLET, DELAYED RELEASE ORAL at 07:28

## 2017-04-21 RX ADMIN — SODIUM CHLORIDE 125 MILLILITER(S): 9 INJECTION INTRAMUSCULAR; INTRAVENOUS; SUBCUTANEOUS at 21:04

## 2017-04-21 RX ADMIN — Medication 325 MILLIGRAM(S): at 11:42

## 2017-04-21 RX ADMIN — SODIUM CHLORIDE 125 MILLILITER(S): 9 INJECTION INTRAMUSCULAR; INTRAVENOUS; SUBCUTANEOUS at 04:00

## 2017-04-21 RX ADMIN — MAGNESIUM OXIDE 400 MG ORAL TABLET 400 MILLIGRAM(S): 241.3 TABLET ORAL at 11:42

## 2017-04-21 NOTE — PROGRESS NOTE ADULT - SUBJECTIVE AND OBJECTIVE BOX
INTERVAL HPI/OVERNIGHT EVENTS:  No acute events overnight. c/o some incisional discomfort. Tolerates fluids.     VITALS:    T(F): 97, Max: 98.7 (04-20 @ 18:35)  HR: 83 (69 - 100)  BP: 96/61 (96/61 - 150/45)  RR: 17 (15 - 17)  SpO2: 100% (98% - 100%)  Wt(kg): --    I&O's Detail    I & Os for current day (as of 21 Apr 2017 05:41)  =============================================  IN:    sodium chloride 0.9%.: 1275 ml    Oral Fluid: 260 ml    IV PiggyBack: 100 ml    Total IN: 1635 ml  ---------------------------------------------  OUT:    Indwelling Catheter - Urethral: 1325 ml    Bulb: 130 ml    Total OUT: 1455 ml  ---------------------------------------------  Total NET: 180 ml      MEDICATIONS:    ANTIBIOTICS:  ceFAZolin   IVPB 1000milliGRAM(s) IV Intermittent every 8 hours      PAIN CONTROL:  acetaminophen   Tablet 650milliGRAM(s) Oral every 6 hours PRN  acetaminophen   Tablet. 650milliGRAM(s) Oral every 6 hours PRN  oxyCODONE  5 mG/acetaminophen 325 mG 1Tablet(s) Oral every 4 hours PRN  oxyCODONE  5 mG/acetaminophen 325 mG 2Tablet(s) Oral every 6 hours PRN  ondansetron Injectable 4milliGRAM(s) IV Push every 6 hours PRN  diazepam    Tablet 5milliGRAM(s) Oral every 8 hours PRN  HYDROmorphone  Injectable 1milliGRAM(s) IV Push every 4 hours PRN       MEDS:  oxybutynin 5milliGRAM(s) Oral every 8 hours PRN      HEME/ONC        PHYSICAL EXAM:  General: No acute distress.  Alert and Oriented  Abdominal Exam: soft, incision site clean and dry, DENNIS intact   Exam: FC intact, urine clear      LABS:                        9.7    13.6  )-----------( 236      ( 20 Apr 2017 19:14 )             29.8     04-20    138  |  105  |  6<L>  ----------------------------<  149<H>  3.4<L>   |  25  |  0.46<L>    Ca    8.1<L>      20 Apr 2017 19:14

## 2017-04-22 ENCOUNTER — TRANSCRIPTION ENCOUNTER (OUTPATIENT)
Age: 47
End: 2017-04-22

## 2017-04-22 LAB
ANION GAP SERPL CALC-SCNC: 9 MMOL/L — SIGNIFICANT CHANGE UP (ref 9–16)
BUN SERPL-MCNC: 4 MG/DL — LOW (ref 7–23)
CALCIUM SERPL-MCNC: 7.8 MG/DL — LOW (ref 8.5–10.5)
CHLORIDE SERPL-SCNC: 110 MMOL/L — HIGH (ref 96–108)
CO2 SERPL-SCNC: 24 MMOL/L — SIGNIFICANT CHANGE UP (ref 22–31)
CREAT SERPL-MCNC: 0.5 MG/DL — SIGNIFICANT CHANGE UP (ref 0.5–1.3)
GLUCOSE SERPL-MCNC: 84 MG/DL — SIGNIFICANT CHANGE UP (ref 70–99)
HCT VFR BLD CALC: 25 % — LOW (ref 34.5–45)
HGB BLD-MCNC: 7.9 G/DL — LOW (ref 11.5–15.5)
MAGNESIUM SERPL-MCNC: 1.7 MG/DL — SIGNIFICANT CHANGE UP (ref 1.6–2.4)
MCHC RBC-ENTMCNC: 24.8 PG — LOW (ref 27–34)
MCHC RBC-ENTMCNC: 31.6 G/DL — LOW (ref 32–36)
MCV RBC AUTO: 78.6 FL — LOW (ref 80–100)
PHOSPHATE SERPL-MCNC: 2.6 MG/DL — SIGNIFICANT CHANGE UP (ref 2.5–4.5)
PLATELET # BLD AUTO: 176 K/UL — SIGNIFICANT CHANGE UP (ref 150–400)
POTASSIUM SERPL-MCNC: 3.5 MMOL/L — SIGNIFICANT CHANGE UP (ref 3.5–5.3)
POTASSIUM SERPL-SCNC: 3.5 MMOL/L — SIGNIFICANT CHANGE UP (ref 3.5–5.3)
RBC # BLD: 3.18 M/UL — LOW (ref 3.8–5.2)
RBC # FLD: 22.1 % — HIGH (ref 10.3–16.9)
SODIUM SERPL-SCNC: 143 MMOL/L — SIGNIFICANT CHANGE UP (ref 135–145)
WBC # BLD: 5.5 K/UL — SIGNIFICANT CHANGE UP (ref 3.8–10.5)
WBC # FLD AUTO: 5.5 K/UL — SIGNIFICANT CHANGE UP (ref 3.8–10.5)

## 2017-04-22 RX ORDER — MAGNESIUM OXIDE 400 MG ORAL TABLET 241.3 MG
400 TABLET ORAL ONCE
Qty: 0 | Refills: 0 | Status: COMPLETED | OUTPATIENT
Start: 2017-04-22 | End: 2017-04-22

## 2017-04-22 RX ORDER — DOCUSATE SODIUM 100 MG
1 CAPSULE ORAL
Qty: 14 | Refills: 0 | OUTPATIENT
Start: 2017-04-22 | End: 2017-04-29

## 2017-04-22 RX ORDER — POTASSIUM CHLORIDE 20 MEQ
40 PACKET (EA) ORAL ONCE
Qty: 0 | Refills: 0 | Status: COMPLETED | OUTPATIENT
Start: 2017-04-22 | End: 2017-04-22

## 2017-04-22 RX ADMIN — Medication 100 MILLIGRAM(S): at 13:40

## 2017-04-22 RX ADMIN — Medication 100 MILLIGRAM(S): at 05:34

## 2017-04-22 RX ADMIN — Medication 100 MILLIGRAM(S): at 22:39

## 2017-04-22 RX ADMIN — SIMETHICONE 80 MILLIGRAM(S): 80 TABLET, CHEWABLE ORAL at 22:39

## 2017-04-22 RX ADMIN — PANTOPRAZOLE SODIUM 40 MILLIGRAM(S): 20 TABLET, DELAYED RELEASE ORAL at 05:34

## 2017-04-22 RX ADMIN — Medication 325 MILLIGRAM(S): at 13:39

## 2017-04-22 RX ADMIN — SODIUM CHLORIDE 125 MILLILITER(S): 9 INJECTION INTRAMUSCULAR; INTRAVENOUS; SUBCUTANEOUS at 03:35

## 2017-04-22 RX ADMIN — Medication 40 MILLIEQUIVALENT(S): at 10:38

## 2017-04-22 RX ADMIN — MAGNESIUM OXIDE 400 MG ORAL TABLET 400 MILLIGRAM(S): 241.3 TABLET ORAL at 10:37

## 2017-04-22 NOTE — DISCHARGE NOTE ADULT - PLAN OF CARE
improvement after surgery Regular diet. Activity as tolerated. Keep incision CDI. If fever >100.4 or any change or worsening of symptoms please call doctor or report to ED. You may restart aspirin tomorrow 4/23. Please make followup appointment with Dr Caban May 1

## 2017-04-22 NOTE — DISCHARGE NOTE ADULT - MEDICATION SUMMARY - MEDICATIONS TO STOP TAKING
I will STOP taking the medications listed below when I get home from the hospital:    Aspir 81 oral delayed release tablet  -- 1 tab(s) by mouth once a day

## 2017-04-22 NOTE — DISCHARGE NOTE ADULT - MEDICATION SUMMARY - MEDICATIONS TO TAKE
I will START or STAY ON the medications listed below when I get home from the hospital:    Aspir 81 oral delayed release tablet  -- 1 tab(s) by mouth once a day  -- Indication: For stop    Percocet 5/325 325 mg-5 mg oral tablet  -- 1 tab(s) by mouth every 6 hours, As Needed -for severe pain MDD:4  -- Caution federal law prohibits the transfer of this drug to any person other  than the person for whom it was prescribed.  May cause drowsiness.  Alcohol may intensify this effect.  Use care when operating dangerous machinery.  This prescription cannot be refilled.  This product contains acetaminophen.  Do not use  with any other product containing acetaminophen to prevent possible liver damage.  Using more of this medication than prescribed may cause serious breathing problems.    -- Indication: For for pain    iron sucrose 20 mg/mL intravenous solution  -- 1  intravenous   -- Indication: For Home med    Colace sodium 100 mg oral capsule  -- 1 cap(s) by mouth 2 times a day, As Needed -for constipation  -- Medication should be taken with plenty of water.    -- Indication: For for constipation

## 2017-04-22 NOTE — DISCHARGE NOTE ADULT - PATIENT PORTAL LINK FT
“You can access the [facility] Patient Portal, offered by [facility], by registering with the following website: [email or web address]”

## 2017-04-22 NOTE — DISCHARGE NOTE ADULT - HOSPITAL COURSE
46F history left renal mass underwent left open partial nephrectomy 4/20. Tolerated procedure well. DENNIS drain removed 4/22. AVSS, afebrile and hemodynamically stable

## 2017-04-22 NOTE — DISCHARGE NOTE ADULT - CARE PLAN
Principal Discharge DX:	Renal mass  Goal:	improvement after surgery  Instructions for follow-up, activity and diet:	Regular diet. Activity as tolerated. Keep incision CDI. If fever >100.4 or any change or worsening of symptoms please call doctor or report to ED. You may restart aspirin tomorrow 4/23. Please make followup appointment with Dr Caban May 1

## 2017-04-22 NOTE — PROGRESS NOTE ADULT - SUBJECTIVE AND OBJECTIVE BOX
AM Note    No acute events overnight.      Vital Signs Last 24 Hrs  T(C): 36.7, Max: 37 (04-21 @ 18:13)  T(F): 98.1, Max: 98.6 (04-21 @ 18:13)  HR: 82 (70 - 88)  BP: 116/66 (91/63 - 118/71)  BP(mean): --  RR: 18 (16 - 18)  SpO2: 97% (96% - 100%)                          10.0   12.9  )-----------( 251      ( 21 Apr 2017 07:33 )             31.8        21 Apr 2017 07:33    139    |  107    |  6      ----------------------------<  146    4.5     |  24     |  0.48     Ca    8.3        21 Apr 2017 07:33  Phos  3.8       21 Apr 2017 07:33  Mg     1.7       21 Apr 2017 07:33          I&O's Summary    I & Os for current day (as of 22 Apr 2017 07:01)  =============================================  IN: 2075 ml / OUT: 1645 ml / NET: 430 ml        PHYSICAL EXAM:    GEN:  ABD:  :

## 2017-04-23 VITALS
OXYGEN SATURATION: 100 % | SYSTOLIC BLOOD PRESSURE: 117 MMHG | RESPIRATION RATE: 16 BRPM | HEART RATE: 73 BPM | DIASTOLIC BLOOD PRESSURE: 75 MMHG | TEMPERATURE: 98 F

## 2017-04-23 PROCEDURE — 86900 BLOOD TYPING SEROLOGIC ABO: CPT

## 2017-04-23 PROCEDURE — 88307 TISSUE EXAM BY PATHOLOGIST: CPT

## 2017-04-23 PROCEDURE — 88302 TISSUE EXAM BY PATHOLOGIST: CPT

## 2017-04-23 PROCEDURE — 86850 RBC ANTIBODY SCREEN: CPT

## 2017-04-23 PROCEDURE — 80048 BASIC METABOLIC PNL TOTAL CA: CPT

## 2017-04-23 PROCEDURE — 86923 COMPATIBILITY TEST ELECTRIC: CPT

## 2017-04-23 PROCEDURE — 85025 COMPLETE CBC W/AUTO DIFF WBC: CPT

## 2017-04-23 PROCEDURE — 83735 ASSAY OF MAGNESIUM: CPT

## 2017-04-23 PROCEDURE — 86901 BLOOD TYPING SEROLOGIC RH(D): CPT

## 2017-04-23 PROCEDURE — 82570 ASSAY OF URINE CREATININE: CPT

## 2017-04-23 PROCEDURE — 85027 COMPLETE CBC AUTOMATED: CPT

## 2017-04-23 PROCEDURE — 36415 COLL VENOUS BLD VENIPUNCTURE: CPT

## 2017-04-23 PROCEDURE — 84100 ASSAY OF PHOSPHORUS: CPT

## 2017-04-23 PROCEDURE — 71045 X-RAY EXAM CHEST 1 VIEW: CPT

## 2017-04-23 RX ADMIN — Medication 100 MILLIGRAM(S): at 14:33

## 2017-04-23 RX ADMIN — PANTOPRAZOLE SODIUM 40 MILLIGRAM(S): 20 TABLET, DELAYED RELEASE ORAL at 08:14

## 2017-04-23 RX ADMIN — Medication 325 MILLIGRAM(S): at 14:33

## 2017-04-23 RX ADMIN — Medication 100 MILLIGRAM(S): at 05:09

## 2017-04-23 NOTE — PROGRESS NOTE ADULT - SUBJECTIVE AND OBJECTIVE BOX
AM NOTE    Patient is a 46y old  Female who presents with a chief complaint of left renal mass s/p L open partial nx 4/20    No acute events o/n        Vital Signs Last 24 Hrs  T(C): 36.3, Max: 37.1 (04-22 @ 09:00)  T(F): 97.3, Max: 98.8 (04-22 @ 09:00)  HR: 71 (71 - 78)  BP: 103/69 (103/69 - 137/85)  BP(mean): --  RR: 16 (16 - 16)  SpO2: 98% (98% - 100%)    I&O's Summary  I & Os for 24h ending 22 Apr 2017 07:00  =============================================  IN: 2075 ml / OUT: 1645 ml / NET: 430 ml    I & Os for current day (as of 23 Apr 2017 06:26)  =============================================  IN: 780 ml / OUT: 600 ml / NET: 180 ml      Gen: NAD    Abd: +incisions CDI, appropriately TTP, soft, ND    : +BRP                          7.9    5.5   )-----------( 176      ( 22 Apr 2017 07:51 )             25.0     04-22    143  |  110<H>  |  4<L>  ----------------------------<  84  3.5   |  24  |  0.50    Ca    7.8<L>      22 Apr 2017 07:51  Phos  2.6     04-22  Mg     1.7     04-22      cultures    A/P  46F s/p L open partial nx  -monitor UO  -pain meds PRN  -diet: reg  -OOB/IS  -d/c home

## 2017-04-24 PROBLEM — D47.3 ESSENTIAL (HEMORRHAGIC) THROMBOCYTHEMIA: Chronic | Status: ACTIVE | Noted: 2017-04-19

## 2017-04-24 PROBLEM — N28.89 OTHER SPECIFIED DISORDERS OF KIDNEY AND URETER: Chronic | Status: ACTIVE | Noted: 2017-04-19

## 2017-04-25 LAB — SURGICAL PATHOLOGY STUDY: SIGNIFICANT CHANGE UP

## 2017-04-26 DIAGNOSIS — K21.9 GASTRO-ESOPHAGEAL REFLUX DISEASE WITHOUT ESOPHAGITIS: ICD-10-CM

## 2017-04-26 DIAGNOSIS — D64.9 ANEMIA, UNSPECIFIED: ICD-10-CM

## 2017-04-26 DIAGNOSIS — N28.89 OTHER SPECIFIED DISORDERS OF KIDNEY AND URETER: ICD-10-CM

## 2017-04-26 DIAGNOSIS — Z28.21 IMMUNIZATION NOT CARRIED OUT BECAUSE OF PATIENT REFUSAL: ICD-10-CM

## 2017-04-26 DIAGNOSIS — Z98.84 BARIATRIC SURGERY STATUS: ICD-10-CM

## 2017-04-27 DIAGNOSIS — C64.2 MALIGNANT NEOPLASM OF LEFT KIDNEY, EXCEPT RENAL PELVIS: ICD-10-CM

## 2017-05-01 ENCOUNTER — APPOINTMENT (OUTPATIENT)
Dept: UROLOGY | Facility: CLINIC | Age: 47
End: 2017-05-01

## 2017-05-01 VITALS
BODY MASS INDEX: 29.06 KG/M2 | WEIGHT: 148 LBS | HEART RATE: 67 BPM | SYSTOLIC BLOOD PRESSURE: 122 MMHG | DIASTOLIC BLOOD PRESSURE: 65 MMHG | TEMPERATURE: 98.7 F | HEIGHT: 60 IN

## 2017-05-25 ENCOUNTER — APPOINTMENT (OUTPATIENT)
Dept: INTERNAL MEDICINE | Facility: CLINIC | Age: 47
End: 2017-05-25

## 2017-05-25 VITALS
TEMPERATURE: 98 F | RESPIRATION RATE: 14 BRPM | DIASTOLIC BLOOD PRESSURE: 70 MMHG | SYSTOLIC BLOOD PRESSURE: 124 MMHG | OXYGEN SATURATION: 98 % | HEART RATE: 69 BPM

## 2017-05-25 DIAGNOSIS — Z78.9 OTHER SPECIFIED HEALTH STATUS: ICD-10-CM

## 2017-05-26 LAB
MEV IGG FLD QL IA: >300 AU/ML
MEV IGG+IGM SER-IMP: POSITIVE
RUBV IGG FLD-ACNC: 2.2 INDEX
RUBV IGG SER-IMP: POSITIVE

## 2017-05-29 LAB
AMPHET UR-MCNC: NEGATIVE
BARBITURATES UR-MCNC: NEGATIVE
BENZODIAZ UR-MCNC: NEGATIVE
COCAINE METAB.OTHER UR-MCNC: NEGATIVE
CREATININE, URINE: 48.7 MG/DL
METHADONE UR-MCNC: NEGATIVE
METHAQUALONE UR-MCNC: NEGATIVE
OPIATES UR-MCNC: NEGATIVE
PCP UR-MCNC: NEGATIVE
PROPOXYPH UR QL: NEGATIVE
THC UR QL: NEGATIVE

## 2017-07-06 ENCOUNTER — CLINICAL ADVICE (OUTPATIENT)
Age: 47
End: 2017-07-06

## 2017-07-11 ENCOUNTER — LABORATORY RESULT (OUTPATIENT)
Age: 47
End: 2017-07-11

## 2017-08-02 ENCOUNTER — APPOINTMENT (OUTPATIENT)
Dept: UROLOGY | Facility: CLINIC | Age: 47
End: 2017-08-02

## 2017-08-07 ENCOUNTER — APPOINTMENT (OUTPATIENT)
Dept: UROLOGY | Facility: CLINIC | Age: 47
End: 2017-08-07

## 2017-08-14 ENCOUNTER — APPOINTMENT (OUTPATIENT)
Dept: UROLOGY | Facility: CLINIC | Age: 47
End: 2017-08-14
Payer: MEDICAID

## 2017-08-14 VITALS
DIASTOLIC BLOOD PRESSURE: 78 MMHG | WEIGHT: 148 LBS | BODY MASS INDEX: 29.06 KG/M2 | TEMPERATURE: 98.4 F | SYSTOLIC BLOOD PRESSURE: 136 MMHG | HEART RATE: 96 BPM | HEIGHT: 60 IN

## 2017-08-14 DIAGNOSIS — C64.2 MALIGNANT NEOPLASM OF LEFT KIDNEY, EXCEPT RENAL PELVIS: ICD-10-CM

## 2017-08-14 PROCEDURE — 99213 OFFICE O/P EST LOW 20 MIN: CPT

## 2017-08-25 ENCOUNTER — APPOINTMENT (OUTPATIENT)
Dept: BARIATRICS | Facility: CLINIC | Age: 47
End: 2017-08-25

## 2017-08-30 ENCOUNTER — APPOINTMENT (OUTPATIENT)
Dept: BARIATRICS | Facility: CLINIC | Age: 47
End: 2017-08-30

## 2017-09-27 ENCOUNTER — FORM ENCOUNTER (OUTPATIENT)
Age: 47
End: 2017-09-27

## 2017-09-28 ENCOUNTER — OUTPATIENT (OUTPATIENT)
Dept: OUTPATIENT SERVICES | Facility: HOSPITAL | Age: 47
LOS: 1 days | End: 2017-09-28
Payer: COMMERCIAL

## 2017-09-28 DIAGNOSIS — Z98.890 OTHER SPECIFIED POSTPROCEDURAL STATES: Chronic | ICD-10-CM

## 2017-09-28 DIAGNOSIS — Z98.51 TUBAL LIGATION STATUS: Chronic | ICD-10-CM

## 2017-09-28 PROCEDURE — 74183 MRI ABD W/O CNTR FLWD CNTR: CPT

## 2017-09-28 PROCEDURE — A9585: CPT

## 2017-09-28 PROCEDURE — 74183 MRI ABD W/O CNTR FLWD CNTR: CPT | Mod: 26

## 2017-12-18 ENCOUNTER — APPOINTMENT (OUTPATIENT)
Dept: BARIATRICS | Facility: CLINIC | Age: 47
End: 2017-12-18
Payer: MEDICAID

## 2017-12-18 VITALS
DIASTOLIC BLOOD PRESSURE: 80 MMHG | BODY MASS INDEX: 32.31 KG/M2 | SYSTOLIC BLOOD PRESSURE: 127 MMHG | WEIGHT: 164.56 LBS | HEIGHT: 60 IN | HEART RATE: 77 BPM | OXYGEN SATURATION: 100 % | TEMPERATURE: 97.8 F

## 2017-12-18 PROCEDURE — 99213 OFFICE O/P EST LOW 20 MIN: CPT

## 2018-01-31 ENCOUNTER — APPOINTMENT (OUTPATIENT)
Dept: BARIATRICS | Facility: CLINIC | Age: 48
End: 2018-01-31
Payer: MEDICAID

## 2018-01-31 VITALS
OXYGEN SATURATION: 98 % | BODY MASS INDEX: 32.05 KG/M2 | TEMPERATURE: 98.1 F | HEART RATE: 69 BPM | HEIGHT: 60 IN | SYSTOLIC BLOOD PRESSURE: 134 MMHG | DIASTOLIC BLOOD PRESSURE: 83 MMHG | WEIGHT: 163.25 LBS

## 2018-01-31 DIAGNOSIS — R63.5 ABNORMAL WEIGHT GAIN: ICD-10-CM

## 2018-01-31 DIAGNOSIS — K43.2 INCISIONAL HERNIA W/OUT OBSTRUCTION OR GANGRENE: ICD-10-CM

## 2018-01-31 PROCEDURE — 99213 OFFICE O/P EST LOW 20 MIN: CPT

## 2018-02-12 ENCOUNTER — APPOINTMENT (OUTPATIENT)
Dept: UROLOGY | Facility: CLINIC | Age: 48
End: 2018-02-12

## 2018-02-14 ENCOUNTER — APPOINTMENT (OUTPATIENT)
Dept: INTERNAL MEDICINE | Facility: CLINIC | Age: 48
End: 2018-02-14
Payer: MEDICAID

## 2018-02-14 VITALS
OXYGEN SATURATION: 98 % | DIASTOLIC BLOOD PRESSURE: 70 MMHG | HEIGHT: 60 IN | RESPIRATION RATE: 14 BRPM | WEIGHT: 163 LBS | HEART RATE: 80 BPM | SYSTOLIC BLOOD PRESSURE: 140 MMHG | TEMPERATURE: 98.8 F | BODY MASS INDEX: 32 KG/M2

## 2018-02-14 VITALS
SYSTOLIC BLOOD PRESSURE: 120 MMHG | HEIGHT: 61 IN | BODY MASS INDEX: 30.78 KG/M2 | OXYGEN SATURATION: 98 % | WEIGHT: 163 LBS | HEART RATE: 70 BPM | DIASTOLIC BLOOD PRESSURE: 80 MMHG | RESPIRATION RATE: 14 BRPM | TEMPERATURE: 98 F

## 2018-02-14 DIAGNOSIS — Z01.818 ENCOUNTER FOR OTHER PREPROCEDURAL EXAMINATION: ICD-10-CM

## 2018-02-14 PROCEDURE — 99214 OFFICE O/P EST MOD 30 MIN: CPT | Mod: 25

## 2018-02-14 PROCEDURE — 93000 ELECTROCARDIOGRAM COMPLETE: CPT

## 2018-02-16 LAB
ALBUMIN SERPL ELPH-MCNC: 3.9 G/DL
ALP BLD-CCNC: 102 U/L
ALT SERPL-CCNC: 9 U/L
ANION GAP SERPL CALC-SCNC: 17 MMOL/L
APPEARANCE: CLEAR
APTT BLD: 31.1 SEC
AST SERPL-CCNC: 17 U/L
BASOPHILS # BLD AUTO: 0.04 K/UL
BASOPHILS NFR BLD AUTO: 0.7 %
BILIRUB SERPL-MCNC: 0.4 MG/DL
BILIRUBIN URINE: NEGATIVE
BLOOD URINE: NEGATIVE
BUN SERPL-MCNC: 5 MG/DL
CALCIUM SERPL-MCNC: 9 MG/DL
CHLORIDE SERPL-SCNC: 103 MMOL/L
CO2 SERPL-SCNC: 22 MMOL/L
COLOR: YELLOW
CREAT SERPL-MCNC: 0.61 MG/DL
EOSINOPHIL # BLD AUTO: 0.05 K/UL
EOSINOPHIL NFR BLD AUTO: 0.8 %
GLUCOSE QUALITATIVE U: NEGATIVE MG/DL
GLUCOSE SERPL-MCNC: 84 MG/DL
HCT VFR BLD CALC: 38 %
HGB BLD-MCNC: 11.8 G/DL
IMM GRANULOCYTES NFR BLD AUTO: 0.2 %
INR PPP: 1.01 RATIO
KETONES URINE: NEGATIVE
LEUKOCYTE ESTERASE URINE: NEGATIVE
LYMPHOCYTES # BLD AUTO: 1.33 K/UL
LYMPHOCYTES NFR BLD AUTO: 22.1 %
MAN DIFF?: NORMAL
MCHC RBC-ENTMCNC: 27.7 PG
MCHC RBC-ENTMCNC: 31.1 GM/DL
MCV RBC AUTO: 89.2 FL
MONOCYTES # BLD AUTO: 0.67 K/UL
MONOCYTES NFR BLD AUTO: 11.1 %
NEUTROPHILS # BLD AUTO: 3.92 K/UL
NEUTROPHILS NFR BLD AUTO: 65.1 %
NITRITE URINE: NEGATIVE
PH URINE: 6.5
PLATELET # BLD AUTO: 264 K/UL
POTASSIUM SERPL-SCNC: 3.8 MMOL/L
PROT SERPL-MCNC: 6.7 G/DL
PROTEIN URINE: NEGATIVE MG/DL
PT BLD: 11.4 SEC
RBC # BLD: 4.26 M/UL
RBC # FLD: 14.4 %
SODIUM SERPL-SCNC: 142 MMOL/L
SPECIFIC GRAVITY URINE: 1.01
UROBILINOGEN URINE: 1 MG/DL
WBC # FLD AUTO: 6.02 K/UL

## 2018-02-26 VITALS
RESPIRATION RATE: 16 BRPM | TEMPERATURE: 96 F | HEART RATE: 70 BPM | WEIGHT: 162.04 LBS | OXYGEN SATURATION: 98 % | HEIGHT: 61 IN | SYSTOLIC BLOOD PRESSURE: 130 MMHG | DIASTOLIC BLOOD PRESSURE: 76 MMHG

## 2018-02-26 NOTE — ASU PATIENT PROFILE, ADULT - PSH
H/O abdominoplasty    H/O gastric bypass    H/O tubal ligation H/O abdominoplasty    H/O gastric bypass    H/O tubal ligation    History of breast surgery  breast implant bilateral  S/P breast augmentation    Surgery, elective  b/l arm sx- plastic

## 2018-02-27 ENCOUNTER — OUTPATIENT (OUTPATIENT)
Dept: OUTPATIENT SERVICES | Facility: HOSPITAL | Age: 48
LOS: 1 days | Discharge: HOME | End: 2018-02-27
Payer: COMMERCIAL

## 2018-02-27 ENCOUNTER — APPOINTMENT (OUTPATIENT)
Dept: BARIATRICS | Facility: HOSPITAL | Age: 48
End: 2018-02-27

## 2018-02-27 VITALS
DIASTOLIC BLOOD PRESSURE: 70 MMHG | HEART RATE: 67 BPM | SYSTOLIC BLOOD PRESSURE: 132 MMHG | TEMPERATURE: 97 F | RESPIRATION RATE: 16 BRPM | OXYGEN SATURATION: 100 %

## 2018-02-27 DIAGNOSIS — Z98.890 OTHER SPECIFIED POSTPROCEDURAL STATES: Chronic | ICD-10-CM

## 2018-02-27 DIAGNOSIS — Z41.9 ENCOUNTER FOR PROCEDURE FOR PURPOSES OTHER THAN REMEDYING HEALTH STATE, UNSPECIFIED: Chronic | ICD-10-CM

## 2018-02-27 DIAGNOSIS — Z98.51 TUBAL LIGATION STATUS: Chronic | ICD-10-CM

## 2018-02-27 DIAGNOSIS — Z98.82 BREAST IMPLANT STATUS: Chronic | ICD-10-CM

## 2018-02-27 PROCEDURE — C1781: CPT

## 2018-02-27 PROCEDURE — 49654: CPT

## 2018-02-27 RX ORDER — SODIUM CHLORIDE 9 MG/ML
1000 INJECTION, SOLUTION INTRAVENOUS
Qty: 0 | Refills: 0 | Status: DISCONTINUED | OUTPATIENT
Start: 2018-02-27 | End: 2018-02-27

## 2018-02-27 RX ORDER — OXYCODONE HYDROCHLORIDE 5 MG/1
5 TABLET ORAL ONCE
Qty: 0 | Refills: 0 | Status: DISCONTINUED | OUTPATIENT
Start: 2018-02-27 | End: 2018-02-27

## 2018-02-27 RX ORDER — ONDANSETRON 8 MG/1
4 TABLET, FILM COATED ORAL
Qty: 0 | Refills: 0 | Status: DISCONTINUED | OUTPATIENT
Start: 2018-02-27 | End: 2018-02-27

## 2018-02-27 RX ORDER — FENTANYL CITRATE 50 UG/ML
25 INJECTION INTRAVENOUS
Qty: 0 | Refills: 0 | Status: DISCONTINUED | OUTPATIENT
Start: 2018-02-27 | End: 2018-02-27

## 2018-02-27 RX ORDER — SODIUM CHLORIDE 9 MG/ML
3 INJECTION INTRAMUSCULAR; INTRAVENOUS; SUBCUTANEOUS ONCE
Qty: 0 | Refills: 0 | Status: DISCONTINUED | OUTPATIENT
Start: 2018-02-27 | End: 2018-02-27

## 2018-02-27 RX ORDER — SENNA PLUS 8.6 MG/1
1 TABLET ORAL
Qty: 10 | Refills: 0 | OUTPATIENT
Start: 2018-02-27

## 2018-02-27 RX ORDER — OXYCODONE HYDROCHLORIDE 5 MG/1
10 TABLET ORAL ONCE
Qty: 0 | Refills: 0 | Status: DISCONTINUED | OUTPATIENT
Start: 2018-02-27 | End: 2018-02-27

## 2018-02-27 RX ORDER — DOCUSATE SODIUM 100 MG
1 CAPSULE ORAL
Qty: 20 | Refills: 0 | OUTPATIENT
Start: 2018-02-27

## 2018-02-27 RX ADMIN — OXYCODONE HYDROCHLORIDE 5 MILLIGRAM(S): 5 TABLET ORAL at 13:35

## 2018-02-27 RX ADMIN — OXYCODONE HYDROCHLORIDE 5 MILLIGRAM(S): 5 TABLET ORAL at 13:19

## 2018-02-27 NOTE — BRIEF OPERATIVE NOTE - OPERATION/FINDINGS
Optiview abdominal access, lysis of adhesions of ventral hernia content. Primary repair of hernia with 0 ethibond suture using endostitch technique. Placement of onlay sepramesh. Hemostasis excellent

## 2018-02-27 NOTE — BRIEF OPERATIVE NOTE - PROCEDURE
<<-----Click on this checkbox to enter Procedure Laparoscopic repair of incisional hernia with mesh  02/27/2018    Active  ANGELINA

## 2018-02-28 RX ORDER — OXYCODONE HYDROCHLORIDE 5 MG/1
1 TABLET ORAL
Qty: 16 | Refills: 0 | OUTPATIENT
Start: 2018-02-28 | End: 2018-03-03

## 2018-03-02 ENCOUNTER — OTHER (OUTPATIENT)
Age: 48
End: 2018-03-02

## 2018-03-12 ENCOUNTER — APPOINTMENT (OUTPATIENT)
Dept: UROLOGY | Facility: CLINIC | Age: 48
End: 2018-03-12

## 2018-03-13 ENCOUNTER — OTHER (OUTPATIENT)
Age: 48
End: 2018-03-13

## 2018-03-28 ENCOUNTER — APPOINTMENT (OUTPATIENT)
Dept: BARIATRICS | Facility: CLINIC | Age: 48
End: 2018-03-28
Payer: MEDICAID

## 2018-03-28 VITALS
BODY MASS INDEX: 29.64 KG/M2 | TEMPERATURE: 98.1 F | HEART RATE: 70 BPM | HEIGHT: 61 IN | DIASTOLIC BLOOD PRESSURE: 89 MMHG | OXYGEN SATURATION: 100 % | SYSTOLIC BLOOD PRESSURE: 148 MMHG | WEIGHT: 157 LBS

## 2018-03-28 DIAGNOSIS — K91.2 POSTSURGICAL MALABSORPTION, NOT ELSEWHERE CLASSIFIED: ICD-10-CM

## 2018-03-28 DIAGNOSIS — K46.9 UNSPECIFIED ABDOMINAL HERNIA W/OUT OBSTRUCTION OR GANGRENE: ICD-10-CM

## 2018-03-28 PROCEDURE — 99024 POSTOP FOLLOW-UP VISIT: CPT

## 2018-04-03 PROBLEM — E55.9 LOW VITAMIN D LEVEL: Status: ACTIVE | Noted: 2017-02-22

## 2018-04-03 LAB
25(OH)D3 SERPL-MCNC: 11.6 NG/ML
A-TOCOPHEROL VIT E SERPL-MCNC: 6.3 MG/L
ALBUMIN SERPL ELPH-MCNC: 4 G/DL
ALP BLD-CCNC: 94 U/L
ALT SERPL-CCNC: 11 U/L
ANION GAP SERPL CALC-SCNC: 12 MMOL/L
AST SERPL-CCNC: 18 U/L
BETA+GAMMA TOCOPHEROL SERPL-MCNC: 1.1 MG/L
BILIRUB SERPL-MCNC: 0.3 MG/DL
BUN SERPL-MCNC: 6 MG/DL
CA-I SERPL-SCNC: 1.18 MMOL/L
CALCIUM SERPL-MCNC: 8.7 MG/DL
CALCIUM SERPL-MCNC: 8.7 MG/DL
CHLORIDE SERPL-SCNC: 105 MMOL/L
CHOLEST SERPL-MCNC: 130 MG/DL
CHOLEST/HDLC SERPL: 2.1 RATIO
CO2 SERPL-SCNC: 24 MMOL/L
CREAT SERPL-MCNC: 0.52 MG/DL
FOLATE SERPL-MCNC: 12.5 NG/ML
GLUCOSE SERPL-MCNC: 108 MG/DL
HDLC SERPL-MCNC: 63 MG/DL
IRON SATN MFR SERPL: 13 %
IRON SERPL-MCNC: 44 UG/DL
LDLC SERPL CALC-MCNC: 57 MG/DL
PARATHYROID HORMONE INTACT: 95 PG/ML
POTASSIUM SERPL-SCNC: 3.9 MMOL/L
PREALB SERPL NEPH-MCNC: 18 MG/DL
PROT SERPL-MCNC: 6.9 G/DL
SODIUM SERPL-SCNC: 141 MMOL/L
TIBC SERPL-MCNC: 339 UG/DL
TRIGL SERPL-MCNC: 52 MG/DL
TSH SERPL-ACNC: 1.21 UIU/ML
UIBC SERPL-MCNC: 295 UG/DL
VIT A SERPL-MCNC: 26 UG/DL
VIT B1 SERPL-MCNC: 127.3 NMOL/L
VIT B12 SERPL-MCNC: 271 PG/ML
ZINC SERPL-MCNC: 55 UG/DL

## 2018-04-03 RX ORDER — ERGOCALCIFEROL 1.25 MG/1
1.25 MG CAPSULE, LIQUID FILLED ORAL
Qty: 4 | Refills: 2 | Status: ACTIVE | COMMUNITY
Start: 2018-04-03 | End: 1900-01-01

## 2018-04-05 ENCOUNTER — APPOINTMENT (OUTPATIENT)
Dept: BARIATRICS | Facility: CLINIC | Age: 48
End: 2018-04-05

## 2018-04-05 DIAGNOSIS — E55.9 VITAMIN D DEFICIENCY, UNSPECIFIED: ICD-10-CM

## 2018-05-09 ENCOUNTER — APPOINTMENT (OUTPATIENT)
Dept: INTERNAL MEDICINE | Facility: CLINIC | Age: 48
End: 2018-05-09
Payer: MEDICAID

## 2018-05-09 VITALS
OXYGEN SATURATION: 99 % | SYSTOLIC BLOOD PRESSURE: 110 MMHG | BODY MASS INDEX: 26.43 KG/M2 | HEART RATE: 73 BPM | RESPIRATION RATE: 14 BRPM | TEMPERATURE: 98 F | HEIGHT: 61 IN | DIASTOLIC BLOOD PRESSURE: 70 MMHG | WEIGHT: 140 LBS

## 2018-05-09 DIAGNOSIS — D50.9 IRON DEFICIENCY ANEMIA, UNSPECIFIED: ICD-10-CM

## 2018-05-09 PROCEDURE — 99396 PREV VISIT EST AGE 40-64: CPT | Mod: 25

## 2018-05-09 PROCEDURE — 93000 ELECTROCARDIOGRAM COMPLETE: CPT

## 2018-05-21 ENCOUNTER — LABORATORY RESULT (OUTPATIENT)
Age: 48
End: 2018-05-21

## 2018-05-21 LAB
ALBUMIN SERPL ELPH-MCNC: 3.9 G/DL
ALP BLD-CCNC: 95 U/L
ALT SERPL-CCNC: 15 U/L
ANION GAP SERPL CALC-SCNC: 12 MMOL/L
APPEARANCE: CLEAR
AST SERPL-CCNC: 24 U/L
BASOPHILS # BLD AUTO: 0.04 K/UL
BASOPHILS NFR BLD AUTO: 0.7 %
BILIRUB SERPL-MCNC: 0.2 MG/DL
BILIRUBIN URINE: NEGATIVE
BLOOD URINE: NEGATIVE
BUN SERPL-MCNC: 10 MG/DL
CALCIUM SERPL-MCNC: 8.9 MG/DL
CHLORIDE SERPL-SCNC: 106 MMOL/L
CHOLEST SERPL-MCNC: 123 MG/DL
CHOLEST/HDLC SERPL: 1.8 RATIO
CO2 SERPL-SCNC: 24 MMOL/L
COLOR: YELLOW
CREAT SERPL-MCNC: 0.57 MG/DL
EOSINOPHIL # BLD AUTO: 0.08 K/UL
EOSINOPHIL NFR BLD AUTO: 1.4 %
GLUCOSE QUALITATIVE U: NEGATIVE MG/DL
GLUCOSE SERPL-MCNC: 111 MG/DL
HBA1C MFR BLD HPLC: 5.8 %
HCT VFR BLD CALC: 37.4 %
HDLC SERPL-MCNC: 70 MG/DL
HGB BLD-MCNC: 11.8 G/DL
IMM GRANULOCYTES NFR BLD AUTO: 0.2 %
KETONES URINE: NEGATIVE
LDLC SERPL CALC-MCNC: 42 MG/DL
LEUKOCYTE ESTERASE URINE: NEGATIVE
LYMPHOCYTES # BLD AUTO: 1.35 K/UL
LYMPHOCYTES NFR BLD AUTO: 23.7 %
MAN DIFF?: NORMAL
MCHC RBC-ENTMCNC: 27.3 PG
MCHC RBC-ENTMCNC: 31.6 GM/DL
MCV RBC AUTO: 86.4 FL
MONOCYTES # BLD AUTO: 0.52 K/UL
MONOCYTES NFR BLD AUTO: 9.1 %
NEUTROPHILS # BLD AUTO: 3.7 K/UL
NEUTROPHILS NFR BLD AUTO: 64.9 %
NITRITE URINE: NEGATIVE
PH URINE: 5
PLATELET # BLD AUTO: 267 K/UL
POTASSIUM SERPL-SCNC: 3.8 MMOL/L
PROT SERPL-MCNC: 6.7 G/DL
PROTEIN URINE: NEGATIVE MG/DL
RBC # BLD: 4.33 M/UL
RBC # FLD: 14.3 %
SODIUM SERPL-SCNC: 142 MMOL/L
SPECIFIC GRAVITY URINE: 1.02
TRIGL SERPL-MCNC: 54 MG/DL
UROBILINOGEN URINE: 1 MG/DL
WBC # FLD AUTO: 5.7 K/UL

## 2018-05-29 LAB
ADJUSTED MITOGEN: >10 IU/ML
ADJUSTED TB AG: 0.03 IU/ML
AMPHET UR-MCNC: NEGATIVE
BARBITURATES UR-MCNC: NEGATIVE
BENZODIAZ UR-MCNC: NEGATIVE
COCAINE METAB.OTHER UR-MCNC: NEGATIVE
CREATININE, URINE: 126.3 MG/DL
M TB IFN-G BLD-IMP: NEGATIVE
METHADONE UR-MCNC: NEGATIVE
METHAQUALONE UR-MCNC: NEGATIVE
OPIATES UR-MCNC: NEGATIVE
PCP UR-MCNC: NEGATIVE
PROPOXYPH UR QL: NEGATIVE
QUANTIFERON GOLD NIL: 0.04 IU/ML
THC UR QL: NORMAL

## 2018-06-18 LAB
AMPHET UR-MCNC: NEGATIVE
BARBITURATES UR-MCNC: NEGATIVE
BENZODIAZ UR-MCNC: NEGATIVE
COCAINE METAB.OTHER UR-MCNC: NEGATIVE
CREATININE, URINE: 166.3 MG/DL
METHADONE UR-MCNC: NEGATIVE
METHAQUALONE UR-MCNC: NEGATIVE
OPIATES UR-MCNC: NEGATIVE
PCP UR-MCNC: NEGATIVE
PROPOXYPH UR QL: NEGATIVE
THC UR QL: NEGATIVE

## 2018-12-16 NOTE — PRE-OP CHECKLIST - PATIENT SENT AT
12/16/18   0542   NA  140  137  137   K  4.4  5.1  4.4   CL  98*  97*  99   CO2  25  24  23   BUN  59*  65*  39*   CREATININE  3.8*  4.4*  3.6*         Electronically signed by Seth Chavez MD on 12/16/2018 at 12:16 PM 07-Feb-2017 16:25

## 2019-03-06 ENCOUNTER — OTHER (OUTPATIENT)
Age: 49
End: 2019-03-06

## 2019-03-06 PROBLEM — E66.01 MORBID (SEVERE) OBESITY DUE TO EXCESS CALORIES: Chronic | Status: ACTIVE | Noted: 2017-02-07

## 2019-03-06 PROBLEM — D64.9 ANEMIA, UNSPECIFIED: Chronic | Status: ACTIVE | Noted: 2017-02-07

## 2019-03-07 ENCOUNTER — FORM ENCOUNTER (OUTPATIENT)
Age: 49
End: 2019-03-07

## 2019-03-08 ENCOUNTER — OUTPATIENT (OUTPATIENT)
Dept: OUTPATIENT SERVICES | Facility: HOSPITAL | Age: 49
LOS: 1 days | End: 2019-03-08
Payer: COMMERCIAL

## 2019-03-08 ENCOUNTER — APPOINTMENT (OUTPATIENT)
Dept: ULTRASOUND IMAGING | Facility: HOSPITAL | Age: 49
End: 2019-03-08
Payer: MEDICAID

## 2019-03-08 DIAGNOSIS — Z98.82 BREAST IMPLANT STATUS: Chronic | ICD-10-CM

## 2019-03-08 DIAGNOSIS — Z98.890 OTHER SPECIFIED POSTPROCEDURAL STATES: Chronic | ICD-10-CM

## 2019-03-08 DIAGNOSIS — Z98.51 TUBAL LIGATION STATUS: Chronic | ICD-10-CM

## 2019-03-08 DIAGNOSIS — Z41.9 ENCOUNTER FOR PROCEDURE FOR PURPOSES OTHER THAN REMEDYING HEALTH STATE, UNSPECIFIED: Chronic | ICD-10-CM

## 2019-03-08 PROCEDURE — 71046 X-RAY EXAM CHEST 2 VIEWS: CPT

## 2019-03-08 PROCEDURE — 76770 US EXAM ABDO BACK WALL COMP: CPT | Mod: 26

## 2019-03-08 PROCEDURE — 76770 US EXAM ABDO BACK WALL COMP: CPT

## 2019-03-08 PROCEDURE — 71046 X-RAY EXAM CHEST 2 VIEWS: CPT | Mod: 26

## 2019-03-13 DIAGNOSIS — C64.9 MALIGNANT NEOPLASM OF UNSPECIFIED KIDNEY, EXCEPT RENAL PELVIS: ICD-10-CM

## 2019-03-13 DIAGNOSIS — N32.89 OTHER SPECIFIED DISORDERS OF BLADDER: ICD-10-CM

## 2019-03-15 ENCOUNTER — MEDICATION RENEWAL (OUTPATIENT)
Age: 49
End: 2019-03-15

## 2019-03-15 ENCOUNTER — RESULT REVIEW (OUTPATIENT)
Age: 49
End: 2019-03-15

## 2019-06-17 ENCOUNTER — APPOINTMENT (OUTPATIENT)
Dept: INTERNAL MEDICINE | Facility: CLINIC | Age: 49
End: 2019-06-17

## 2019-10-28 ENCOUNTER — APPOINTMENT (OUTPATIENT)
Dept: INTERNAL MEDICINE | Facility: CLINIC | Age: 49
End: 2019-10-28

## 2019-12-02 ENCOUNTER — APPOINTMENT (OUTPATIENT)
Dept: INTERNAL MEDICINE | Facility: CLINIC | Age: 49
End: 2019-12-02
Payer: MEDICAID

## 2019-12-02 VITALS
BODY MASS INDEX: 26.08 KG/M2 | HEART RATE: 76 BPM | WEIGHT: 138 LBS | SYSTOLIC BLOOD PRESSURE: 142 MMHG | DIASTOLIC BLOOD PRESSURE: 76 MMHG | TEMPERATURE: 97.9 F | OXYGEN SATURATION: 97 %

## 2019-12-02 DIAGNOSIS — Z00.00 ENCOUNTER FOR GENERAL ADULT MEDICAL EXAMINATION W/OUT ABNORMAL FINDINGS: ICD-10-CM

## 2019-12-02 DIAGNOSIS — Z23 ENCOUNTER FOR IMMUNIZATION: ICD-10-CM

## 2019-12-02 DIAGNOSIS — B07.0 PLANTAR WART: ICD-10-CM

## 2019-12-02 DIAGNOSIS — R21 RASH AND OTHER NONSPECIFIC SKIN ERUPTION: ICD-10-CM

## 2019-12-02 PROCEDURE — 93000 ELECTROCARDIOGRAM COMPLETE: CPT

## 2019-12-02 PROCEDURE — 90471 IMMUNIZATION ADMIN: CPT

## 2019-12-02 PROCEDURE — 99396 PREV VISIT EST AGE 40-64: CPT | Mod: 25,GY

## 2019-12-02 PROCEDURE — 36415 COLL VENOUS BLD VENIPUNCTURE: CPT

## 2019-12-02 PROCEDURE — 90688 IIV4 VACCINE SPLT 0.5 ML IM: CPT

## 2019-12-02 RX ORDER — BENZONATATE 200 MG/1
200 CAPSULE ORAL
Qty: 21 | Refills: 1 | Status: DISCONTINUED | COMMUNITY
Start: 2018-05-09 | End: 2019-12-02

## 2019-12-02 RX ORDER — TRIAMCINOLONE ACETONIDE 1 MG/G
0.1 CREAM TOPICAL 3 TIMES DAILY
Qty: 1 | Refills: 0 | Status: ACTIVE | COMMUNITY
Start: 2019-12-02 | End: 1900-01-01

## 2019-12-02 RX ORDER — MULTIVITAMIN
TABLET ORAL
Qty: 1 | Refills: 11 | Status: ACTIVE | COMMUNITY
Start: 1900-01-01 | End: 1900-01-01

## 2019-12-02 RX ORDER — CHLORHEXIDINE GLUCONATE 4 %
325 (65 FE) LIQUID (ML) TOPICAL TWICE DAILY
Qty: 60 | Refills: 11 | Status: ACTIVE | COMMUNITY
Start: 2017-02-22 | End: 1900-01-01

## 2019-12-02 NOTE — HISTORY OF PRESENT ILLNESS
[FreeTextEntry1] : wellness [de-identified] : Concern about depression: and trouble sleeping.  - intermittent poor appetite.  - Saw a psychiatrist 10 years ago.    \par Has a rash on her back- itching one month\par FOot issue - pain with walking\par Taking only vitamins now and a baby asa daily\par cough for 3 days - multiple sick contacts - no fever

## 2019-12-02 NOTE — PHYSICAL EXAM
[Well Nourished] : well nourished [No Acute Distress] : no acute distress [Well Developed] : well developed [Well-Appearing] : well-appearing [PERRL] : pupils equal round and reactive to light [Normal Sclera/Conjunctiva] : normal sclera/conjunctiva [EOMI] : extraocular movements intact [Normal Outer Ear/Nose] : the outer ears and nose were normal in appearance [No JVD] : no jugular venous distention [Normal Oropharynx] : the oropharynx was normal [Supple] : supple [No Lymphadenopathy] : no lymphadenopathy [Thyroid Normal, No Nodules] : the thyroid was normal and there were no nodules present [No Respiratory Distress] : no respiratory distress  [No Accessory Muscle Use] : no accessory muscle use [Clear to Auscultation] : lungs were clear to auscultation bilaterally [Normal Rate] : normal rate  [Regular Rhythm] : with a regular rhythm [Normal S1, S2] : normal S1 and S2 [No Murmur] : no murmur heard [Pedal Pulses Present] : the pedal pulses are present [No Edema] : there was no peripheral edema [Soft] : abdomen soft [Non Tender] : non-tender [Non-distended] : non-distended [No Masses] : no abdominal mass palpated [No HSM] : no HSM [Normal Bowel Sounds] : normal bowel sounds [Normal Posterior Cervical Nodes] : no posterior cervical lymphadenopathy [Normal Anterior Cervical Nodes] : no anterior cervical lymphadenopathy [No CVA Tenderness] : no CVA  tenderness [No Spinal Tenderness] : no spinal tenderness [No Joint Swelling] : no joint swelling [Grossly Normal Strength/Tone] : grossly normal strength/tone [No Rash] : no rash [Coordination Grossly Intact] : coordination grossly intact [No Focal Deficits] : no focal deficits [Normal Gait] : normal gait [Normal Affect] : the affect was normal [Normal Insight/Judgement] : insight and judgment were intact [de-identified] : base of foot 5mm round hardened area of skin. non tender

## 2019-12-02 NOTE — PLAN
[FreeTextEntry1] : \par \par plantar wart- referral for podiatry\par Rash- start triamcinolone\par Fatigue- \par - EKG NSR @ 60BPM - - no sig findings.  \par Depression- referralfor psych

## 2019-12-03 LAB
25(OH)D3 SERPL-MCNC: 12.3 NG/ML
ALBUMIN SERPL ELPH-MCNC: 4.2 G/DL
ALP BLD-CCNC: 113 U/L
ALT SERPL-CCNC: 16 U/L
ANION GAP SERPL CALC-SCNC: 15 MMOL/L
APPEARANCE: ABNORMAL
AST SERPL-CCNC: 21 U/L
BASOPHILS # BLD AUTO: 0.07 K/UL
BASOPHILS NFR BLD AUTO: 1.3 %
BILIRUB SERPL-MCNC: 0.4 MG/DL
BILIRUBIN URINE: NEGATIVE
BLOOD URINE: NEGATIVE
BUN SERPL-MCNC: 8 MG/DL
CALCIUM SERPL-MCNC: 9.4 MG/DL
CHLORIDE SERPL-SCNC: 106 MMOL/L
CHOLEST SERPL-MCNC: 148 MG/DL
CHOLEST/HDLC SERPL: 1.9 RATIO
CO2 SERPL-SCNC: 22 MMOL/L
COLOR: YELLOW
CREAT SERPL-MCNC: 0.55 MG/DL
EOSINOPHIL # BLD AUTO: 0.07 K/UL
EOSINOPHIL NFR BLD AUTO: 1.3 %
ESTIMATED AVERAGE GLUCOSE: 123 MG/DL
FERRITIN SERPL-MCNC: 30 NG/ML
FOLATE SERPL-MCNC: 9.1 NG/ML
GLUCOSE QUALITATIVE U: NEGATIVE
GLUCOSE SERPL-MCNC: 89 MG/DL
HBA1C MFR BLD HPLC: 5.9 %
HCT VFR BLD CALC: 39.6 %
HDLC SERPL-MCNC: 77 MG/DL
HGB BLD-MCNC: 12.7 G/DL
IMM GRANULOCYTES NFR BLD AUTO: 0.2 %
IRON SATN MFR SERPL: 26 %
IRON SERPL-MCNC: 100 UG/DL
KETONES URINE: NEGATIVE
LDLC SERPL CALC-MCNC: 53 MG/DL
LEUKOCYTE ESTERASE URINE: NEGATIVE
LYMPHOCYTES # BLD AUTO: 1.35 K/UL
LYMPHOCYTES NFR BLD AUTO: 24.9 %
MAN DIFF?: NORMAL
MCHC RBC-ENTMCNC: 28.4 PG
MCHC RBC-ENTMCNC: 32.1 GM/DL
MCV RBC AUTO: 88.6 FL
MONOCYTES # BLD AUTO: 0.51 K/UL
MONOCYTES NFR BLD AUTO: 9.4 %
NEUTROPHILS # BLD AUTO: 3.41 K/UL
NEUTROPHILS NFR BLD AUTO: 62.9 %
NITRITE URINE: POSITIVE
PH URINE: 6
PLATELET # BLD AUTO: 251 K/UL
POTASSIUM SERPL-SCNC: 3.9 MMOL/L
PROT SERPL-MCNC: 6.9 G/DL
PROTEIN URINE: NEGATIVE
RBC # BLD: 4.47 M/UL
RBC # FLD: 13.6 %
SODIUM SERPL-SCNC: 143 MMOL/L
SPECIFIC GRAVITY URINE: 1.02
TIBC SERPL-MCNC: 392 UG/DL
TRIGL SERPL-MCNC: 90 MG/DL
UIBC SERPL-MCNC: 292 UG/DL
UROBILINOGEN URINE: NORMAL
VIT B12 SERPL-MCNC: 231 PG/ML
WBC # FLD AUTO: 5.42 K/UL

## 2019-12-04 LAB
M TB IFN-G BLD-IMP: NEGATIVE
MEV IGG FLD QL IA: >300 AU/ML
MEV IGG+IGM SER-IMP: POSITIVE
QUANTIFERON TB PLUS MITOGEN MINUS NIL: 2.38 IU/ML
QUANTIFERON TB PLUS NIL: 0.02 IU/ML
QUANTIFERON TB PLUS TB1 MINUS NIL: 0 IU/ML
QUANTIFERON TB PLUS TB2 MINUS NIL: 0 IU/ML
RUBV IGG FLD-ACNC: 1.7 INDEX
RUBV IGG SER-IMP: POSITIVE

## 2019-12-04 RX ORDER — ERGOCALCIFEROL 1.25 MG/1
1.25 MG CAPSULE ORAL
Qty: 4 | Refills: 2 | Status: ACTIVE | COMMUNITY
Start: 2019-12-04 | End: 1900-01-01

## 2019-12-05 LAB
AMPHET UR-MCNC: NEGATIVE
BARBITURATES UR-MCNC: NEGATIVE
BENZODIAZ UR-MCNC: NEGATIVE
COCAINE METAB.OTHER UR-MCNC: NEGATIVE
CREATININE, URINE: 116.8 MG/DL
METHADONE UR-MCNC: NEGATIVE
METHAQUALONE UR-MCNC: NEGATIVE
OPIATES UR-MCNC: NEGATIVE
PCP UR-MCNC: NEGATIVE
PROPOXYPH UR QL: NEGATIVE
THC UR QL: NEGATIVE

## 2020-01-31 LAB — HIV1+2 AB SPEC QL IA.RAPID: NONREACTIVE

## 2020-03-05 ENCOUNTER — APPOINTMENT (OUTPATIENT)
Dept: INTERNAL MEDICINE | Facility: CLINIC | Age: 50
End: 2020-03-05

## 2020-12-15 PROBLEM — Z11.1 ENCOUNTER FOR PPD TEST: Status: RESOLVED | Noted: 2017-04-03 | Resolved: 2020-12-15

## 2024-03-12 NOTE — PROGRESS NOTE ADULT - SUBJECTIVE AND OBJECTIVE BOX
ID # [id number]    Auth # [id number]    UR Phone # [phone]      45 yo female with no complaints today. Has passed gas.  Denies fever, chills, n/v, chest pain, sob, abdominal pain, leg pain, lightheadedness, dizziness.                         7.6    4.9   )-----------( 123      ( 10 Feb 2017 07:19 )             26.5     10 Feb 2017 07:19    142    |  106    |  5      ----------------------------<  68     3.4     |  29     |  0.49     Ca    8.3        10 Feb 2017 07:19  Phos  4.2       10 Feb 2017 07:19  Mg     1.8       10 Feb 2017 07:19    T(C): 36.6, Max: 37.4 (02-09 @ 20:23)  HR: 73 (71 - 80)  BP: 108/70 (108/70 - 126/77)  RR: 16 (16 - 18)  SpO2: 97% (96% - 99%)  Wt(kg): --    gen: A&0x3, NAD  Heart: s1,s2 RRR,   Lung: CTA B/L  Abd: n/d, n/t, soft, incisions c/d/i  LE: no edema, b/l LE, no jaiden b/l LE  skin: MMM    45 yo female s/p internal hernia repair, vss, oob, tolerating po, passing gas,     Plan:  oob, scds, heparin  ppi  nausea control prn  pain control prn  BCLD 45 yo female with no complaints today. Has passed gas.  Denies fever, chills, n/v, chest pain, sob, abdominal pain, leg pain, lightheadedness, dizziness.                         7.6    4.9   )-----------( 123      ( 10 Feb 2017 07:19 )             26.5     10 Feb 2017 07:19    142    |  106    |  5      ----------------------------<  68     3.4     |  29     |  0.49     Ca    8.3        10 Feb 2017 07:19  Phos  4.2       10 Feb 2017 07:19  Mg     1.8       10 Feb 2017 07:19    T(C): 36.6, Max: 37.4 (02-09 @ 20:23)  HR: 73 (71 - 80)  BP: 108/70 (108/70 - 126/77)  RR: 16 (16 - 18)  SpO2: 97% (96% - 99%)  Wt(kg): --    gen: A&0x3, NAD  Heart: s1,s2 RRR,   Lung: CTA B/L  Abd: n/d, n/t, soft, incisions c/d/i  LE: no edema, b/l LE, no jaiden b/l LE  skin: MMM    45 yo female s/p internal hernia repair, vss, oob, tolerating po, passing gas,     Plan:  incentive spirometer  oob, scds, heparin  ppi  nausea control prn  pain control prn  BCLD

## 2025-03-21 ENCOUNTER — APPOINTMENT (OUTPATIENT)
Dept: PHYSICAL MEDICINE AND REHAB | Facility: CLINIC | Age: 55
End: 2025-03-21